# Patient Record
Sex: FEMALE | Race: WHITE | NOT HISPANIC OR LATINO | Employment: FULL TIME | URBAN - METROPOLITAN AREA
[De-identification: names, ages, dates, MRNs, and addresses within clinical notes are randomized per-mention and may not be internally consistent; named-entity substitution may affect disease eponyms.]

---

## 2018-05-02 LAB
ABSOL LYMPHOCYTES (HISTORICAL): 2.8 K/UL (ref 0.5–4)
ALBUMIN SERPL BCP-MCNC: 4.3 G/DL (ref 3–5.2)
ALP SERPL-CCNC: 76 U/L (ref 43–122)
ALT SERPL W P-5'-P-CCNC: 25 U/L (ref 9–52)
AMORPHOUS MATERIAL (HISTORICAL): ABNORMAL
ANION GAP SERPL CALCULATED.3IONS-SCNC: 12 MMOL/L (ref 5–14)
AST SERPL W P-5'-P-CCNC: 15 U/L (ref 14–36)
BACTERIA UR QL AUTO: ABNORMAL
BASOPHILS # BLD AUTO: 0.1 K/UL (ref 0–0.1)
BASOPHILS # BLD AUTO: 1 % (ref 0–1)
BILIRUB SERPL-MCNC: 0.3 MG/DL
BILIRUB UR QL STRIP: NEGATIVE MG/DL
BUN SERPL-MCNC: 15 MG/DL (ref 5–25)
CALCIUM SERPL-MCNC: 9.4 MG/DL (ref 8.4–10.2)
CASTS/CASTS TYPE (HISTORICAL): ABNORMAL /LPF
CHLORIDE SERPL-SCNC: 101 MEQ/L (ref 97–108)
CLARITY UR: CLEAR
CO2 SERPL-SCNC: 24 MMOL/L (ref 22–30)
COLOR UR: ABNORMAL
CREATINE, SERUM (HISTORICAL): 0.59 MG/DL (ref 0.6–1.2)
CRYSTAL TYPE (HISTORICAL): ABNORMAL /HPF
DEPRECATED RDW RBC AUTO: 13.9 %
EGFR (HISTORICAL): >60 ML/MIN/1.73 M2
EOSINOPHIL # BLD AUTO: 0.3 K/UL (ref 0–0.4)
EOSINOPHIL NFR BLD AUTO: 3 % (ref 0–6)
GLUCOSE SERPL-MCNC: 110 MG/DL (ref 70–99)
GLUCOSE UR STRIP-MCNC: NEGATIVE MG/DL
HCT VFR BLD AUTO: 38.1 % (ref 36–46)
HGB BLD-MCNC: 12.5 G/DL (ref 12–16)
HGB UR QL STRIP.AUTO: ABNORMAL
KETONES UR STRIP-MCNC: NEGATIVE MG/DL
LEUKOCYTE ESTERASE UR QL STRIP: NEGATIVE
LYMPHOCYTES NFR BLD AUTO: 23 % (ref 25–45)
MCH RBC QN AUTO: 27.7 PG (ref 26–34)
MCHC RBC AUTO-ENTMCNC: 32.8 % (ref 31–36)
MCV RBC AUTO: 84 FL (ref 80–100)
MONOCYTES # BLD AUTO: 0.7 K/UL (ref 0.2–0.9)
MONOCYTES NFR BLD AUTO: 6 % (ref 1–10)
MUCOUS THREADS URNS QL MICRO: ABNORMAL
NEUTROPHILS ABS COUNT (HISTORICAL): 8 K/UL (ref 1.8–7.8)
NEUTS SEG NFR BLD AUTO: 67 % (ref 45–65)
NITRITE UR QL STRIP: NEGATIVE
NON-SQ EPI CELLS URNS QL MICRO: ABNORMAL
OTHER STN SPEC: ABNORMAL
PH UR STRIP.AUTO: 6 [PH] (ref 4.5–8)
PLATELET # BLD AUTO: 268 K/MCL (ref 150–450)
POTASSIUM SERPL-SCNC: 3.9 MEQ/L (ref 3.6–5)
PROT UR STRIP-MCNC: NEGATIVE MG/DL
RBC # BLD AUTO: 4.52 M/MCL (ref 4–5.2)
RBC #/AREA URNS AUTO: ABNORMAL /HPF
SODIUM SERPL-SCNC: 137 MEQ/L (ref 137–147)
SP GR UR STRIP.AUTO: 1.01 (ref 1–1.04)
TOTAL PROTEIN (HISTORICAL): 6.9 G/DL (ref 5.9–8.4)
UROBILINOGEN UR QL STRIP.AUTO: NEGATIVE MG/DL (ref 0–1)
WBC # BLD AUTO: 11.9 K/MCL (ref 4.5–11)
WBC #/AREA URNS AUTO: ABNORMAL /HPF

## 2018-05-10 LAB — PREGNANCY TEST URINE (HISTORICAL): NEGATIVE

## 2018-08-01 ENCOUNTER — OFFICE VISIT (OUTPATIENT)
Dept: SURGERY | Facility: CLINIC | Age: 38
End: 2018-08-01

## 2018-08-01 VITALS
DIASTOLIC BLOOD PRESSURE: 79 MMHG | RESPIRATION RATE: 18 BRPM | SYSTOLIC BLOOD PRESSURE: 120 MMHG | HEART RATE: 78 BPM | HEIGHT: 69 IN | WEIGHT: 245 LBS | BODY MASS INDEX: 36.29 KG/M2

## 2018-08-01 DIAGNOSIS — E66.01 MORBID (SEVERE) OBESITY DUE TO EXCESS CALORIES (HCC): Primary | ICD-10-CM

## 2018-08-01 PROCEDURE — 99024 POSTOP FOLLOW-UP VISIT: CPT | Performed by: SPECIALIST

## 2018-08-01 RX ORDER — CYANOCOBALAMIN 500 UG/1
SPRAY NASAL
COMMUNITY
Start: 2018-07-25 | End: 2020-11-16

## 2018-08-01 RX ORDER — IRON POLYSACCHARIDE COMPLEX 150 MG
150 CAPSULE ORAL 2 TIMES DAILY
COMMUNITY

## 2018-08-01 RX ORDER — MULTIVIT-MIN/IRON FUM/FOLIC AC 7.5 MG-4
1 TABLET ORAL DAILY
COMMUNITY
End: 2020-02-04

## 2018-08-01 NOTE — PROGRESS NOTES
The patient is a little over 2 months status post laparoscopic Deedee-en-Y gastric bypass for morbid obesity  At that time she weighed 300 lb current weighs 245  Lost 55 lb  No complaints tolerating her diet  Not hungry most of the time  Also remind herself to eat  She has about 3 meals a day  Mainly pro protein  She is losing little bit of her hair    She takes multiple vitamin with minerals she also takes calcium iron B12  Her bowels move normally for the most part    She uses the elliptical   She swims  She tried water for about an hour and half yesterday and is doing quite well  She is going to run a 5K over the next few weeks  On physical exam is regular no distress   her abdomen is obese soft nontender  Her scars are healing well with no evidence of hernia  Impression doing quite well  Plan continue the same bring her back in 3 months

## 2018-11-07 ENCOUNTER — OFFICE VISIT (OUTPATIENT)
Dept: SURGERY | Facility: CLINIC | Age: 38
End: 2018-11-07
Payer: COMMERCIAL

## 2018-11-07 VITALS
HEART RATE: 80 BPM | RESPIRATION RATE: 16 BRPM | DIASTOLIC BLOOD PRESSURE: 60 MMHG | WEIGHT: 202 LBS | SYSTOLIC BLOOD PRESSURE: 94 MMHG | HEIGHT: 69 IN | BODY MASS INDEX: 29.92 KG/M2

## 2018-11-07 DIAGNOSIS — Z98.84 BARIATRIC SURGERY STATUS: Primary | ICD-10-CM

## 2018-11-07 PROCEDURE — 99213 OFFICE O/P EST LOW 20 MIN: CPT | Performed by: SPECIALIST

## 2018-11-07 NOTE — PROGRESS NOTES
Laparoscopic Deedee-en-Y gastric bypass for morbid obesity  In May of this year she weighed 283 lb  Today in the office November she weighs 202 lb  She is doing quite well and is quite happy  She has tremendous energy, sleeps better  She does have a few issues  She gets lightheaded when she goes from a sitting to a standing position although not all the time  This is been going on for few weeks  She denies any upper respiratory infection, head colds, or ear issues  She also complains of some occasional pain in her left subcostal area near her largest incision  She denies any bulge in the area  She is taking vitamins  Her bowels are moving  Physical exam:  Young adult white female awake alert no distress    Abdomen:  Obvious weight loss is present with lax skin  Her incisions are healing well with no evidence of infection  Her left subcostal incision demonstrates no obvious hernia although is sensitive to deep palpation  Impression:  Doing well status post laparoscopic Deedee-en-Y gastric bypass for morbid obesity    Plan: Will check laboratory values vitamin levels etc to get a new baseline  She is also told to increase her fluid intake  She is told to monitor the left subcostal incision as it should improve  She developed a bulge over the area that she has an obvious incisional hernia should notify the office  At this point she is discharged from the office who brought back in a few months pending laboratory values

## 2019-02-06 ENCOUNTER — OFFICE VISIT (OUTPATIENT)
Dept: SURGERY | Facility: CLINIC | Age: 39
End: 2019-02-06
Payer: COMMERCIAL

## 2019-02-06 VITALS
HEART RATE: 72 BPM | DIASTOLIC BLOOD PRESSURE: 80 MMHG | BODY MASS INDEX: 26.22 KG/M2 | HEIGHT: 69 IN | SYSTOLIC BLOOD PRESSURE: 116 MMHG | WEIGHT: 177 LBS | TEMPERATURE: 97.8 F

## 2019-02-06 DIAGNOSIS — Z98.84 BARIATRIC SURGERY STATUS: Primary | ICD-10-CM

## 2019-02-06 PROCEDURE — 99213 OFFICE O/P EST LOW 20 MIN: CPT | Performed by: SPECIALIST

## 2019-02-06 NOTE — PROGRESS NOTES
Liborio Gamez presents today for follow-up visit status post laparoscopic Deedee-en-Y gastric bypass for morbid obesity  On 05/10/2018 she weighed 285 lb  Today in the office 02/06/2019 she weighs 177 lb  She feels great  She looks great  She says she gets cold easier now which is not unusual status post bariatric surgery  She eats 3-4 meals a day i e  soup salads chicken shrimp tacos etc etc   She gets full quite easy  She had a episode of dumping after eating some honey which is fairly impressive and she would like to avoid  She takes multiple vitamins the bariatric an also gummy   Bowel movements every day but takes stool softeners  She works out on the elliptical   She has about half an hour to a shot which is actually quite good  She also tries to walk at least a 1000 steps a day which is close to 3 miles  Physical exam:  Young adult white female who does not look obese  Abdomen:  Soft nontender large pannus noted no incisional hernias are present  Impression:  Doing well status post laparoscopic Deedee-en-Y gastric bypass for morbid obesity  Plan:  Continue the same  Check laboratory values obtained when last seen in these were quite normal  Return in 6 months

## 2019-08-06 ENCOUNTER — OFFICE VISIT (OUTPATIENT)
Dept: SURGERY | Facility: CLINIC | Age: 39
End: 2019-08-06
Payer: COMMERCIAL

## 2019-08-06 VITALS
WEIGHT: 152 LBS | SYSTOLIC BLOOD PRESSURE: 118 MMHG | HEART RATE: 64 BPM | BODY MASS INDEX: 22.51 KG/M2 | HEIGHT: 69 IN | TEMPERATURE: 67.5 F | DIASTOLIC BLOOD PRESSURE: 68 MMHG

## 2019-08-06 DIAGNOSIS — Z98.84 BARIATRIC SURGERY STATUS: Primary | ICD-10-CM

## 2019-08-06 PROCEDURE — 99213 OFFICE O/P EST LOW 20 MIN: CPT | Performed by: SPECIALIST

## 2019-08-06 NOTE — PROGRESS NOTES
Ainsley Wagner presents today for postop visit status post laparoscopic Deedee-en-Y gastric bypass for morbid obesity  She had a laparoscopic Deeede-en-Y gastric bypass in May of 2018  At that time she weighed 283  ( she actually 300 prior to that )  Today in the office she weighs 152 lb  She looks marvelous   Dorathy Infield She feels marvelous  She is quite active  She has run 2 individual 5K races  She also  Swam a half a mile recently  She is not ready for a triathlon  She has about 3 times a day  She does have snacks  She does get a little lightheaded on occasion but   A snack fixes this  She does get hungry sometimes but gets full easy  She takes her multiple vitamins daily  Her bowels move without incident  Physical exam:  Adult white female who is awake alert no distress    Abdomen:  Soft flat redundant skin and pannus is noted  Laparoscopic incisions are healing well with no evidence of infection or hernia  She has excellent cosmetic result  We checked her labs in the past and they were quite good    Impression:  Doing well status post laparoscopic Deedee-en-Y gastric bypass morbid obesity    Plan: Return 6 months    Keep up the good work

## 2020-02-04 ENCOUNTER — OFFICE VISIT (OUTPATIENT)
Dept: SURGERY | Facility: CLINIC | Age: 40
End: 2020-02-04
Payer: COMMERCIAL

## 2020-02-04 VITALS
HEIGHT: 69 IN | HEART RATE: 62 BPM | WEIGHT: 154.5 LBS | RESPIRATION RATE: 16 BRPM | DIASTOLIC BLOOD PRESSURE: 62 MMHG | SYSTOLIC BLOOD PRESSURE: 114 MMHG | BODY MASS INDEX: 22.88 KG/M2

## 2020-02-04 DIAGNOSIS — Z98.84 BARIATRIC SURGERY STATUS: Primary | ICD-10-CM

## 2020-02-04 PROCEDURE — 99213 OFFICE O/P EST LOW 20 MIN: CPT | Performed by: SPECIALIST

## 2020-02-04 RX ORDER — DOCUSATE SODIUM 100 MG/1
100 CAPSULE, LIQUID FILLED ORAL
COMMUNITY

## 2020-02-04 NOTE — PROGRESS NOTES
Leelee Estrada presents today for follow-up visit status post laparoscopic Deedee-en-Y gastric bypass for morbid obesity  In May of 2018 she weighed 283 lb  She currently weighs about 154 lb  Which is pretty stable since when last seen in August she weighed 152 lb  When last seen she complained of some lower abdominal pain  She was informed by 1 of her health caregivers to follow up with her gastric bypass surgeon in regards to this  We actually told to follow up with gynecology and she did  Apparently a portion of her Essure device fractured and had migrated  This precipitated a fairly quick surgery where she underwent hysterectomy robotically  This was difficult because she had C-sections in the past had a lot of adhesions etc etc   There is still a few issues but she is finally coming out of it  Her eating habits are good  She eats about 3 meals a day but also snacks in between on healthy stuff  She has every 2-3 hours but string cheese protein shakes chicken protein etc   She takes multiple vitamins  Her bowels move fine  Physical exam:  Young adult white female who looks slim awake alert no distress    Abdomen:  A significant amount of redundant skin is noted with a pannus  Laparoscopic incisions are healing well no hernias noted  Excellent cosmetic result  Impression doing well status post laparoscopic Deedee-en-Y gastric bypass for morbid obesity  Fairly recent hysterectomy robotically  She has not been exercising since her recent surgery and is told to keep an eye on this goes a may impact her weight loss/regain  Plan:  Return here about 6 months  Prep plastic surgery is in her future in regards to her pannus  If she needs this or has any issues or problems she should call

## 2020-10-29 DIAGNOSIS — Z98.84 BARIATRIC SURGERY STATUS: Primary | ICD-10-CM

## 2020-11-16 RX ORDER — CYANOCOBALAMIN 500 UG/1
SPRAY NASAL
Qty: 4 ML | Refills: 11 | Status: SHIPPED | OUTPATIENT
Start: 2020-11-16 | End: 2021-08-23

## 2021-08-23 DIAGNOSIS — Z98.84 BARIATRIC SURGERY STATUS: ICD-10-CM

## 2021-08-23 RX ORDER — CYANOCOBALAMIN 500 UG/1
SPRAY NASAL
Qty: 4 ML | Refills: 11 | Status: SHIPPED | OUTPATIENT
Start: 2021-08-23 | End: 2022-07-06

## 2021-12-22 ENCOUNTER — OFFICE VISIT (OUTPATIENT)
Dept: URGENT CARE | Facility: CLINIC | Age: 41
End: 2021-12-22
Payer: COMMERCIAL

## 2021-12-22 VITALS
HEART RATE: 80 BPM | OXYGEN SATURATION: 96 % | BODY MASS INDEX: 23.7 KG/M2 | HEIGHT: 69 IN | TEMPERATURE: 96.3 F | WEIGHT: 160 LBS | RESPIRATION RATE: 16 BRPM

## 2021-12-22 DIAGNOSIS — J06.9 VIRAL URI: Primary | ICD-10-CM

## 2021-12-22 DIAGNOSIS — Z20.822 EXPOSURE TO COVID-19 VIRUS: ICD-10-CM

## 2021-12-22 PROCEDURE — 99213 OFFICE O/P EST LOW 20 MIN: CPT | Performed by: PHYSICIAN ASSISTANT

## 2021-12-22 PROCEDURE — 87636 SARSCOV2 & INF A&B AMP PRB: CPT | Performed by: PHYSICIAN ASSISTANT

## 2021-12-24 LAB
FLUAV RNA RESP QL NAA+PROBE: NEGATIVE
FLUBV RNA RESP QL NAA+PROBE: NEGATIVE
SARS-COV-2 RNA RESP QL NAA+PROBE: POSITIVE

## 2021-12-29 ENCOUNTER — OFFICE VISIT (OUTPATIENT)
Dept: URGENT CARE | Facility: CLINIC | Age: 41
End: 2021-12-29
Payer: COMMERCIAL

## 2021-12-29 ENCOUNTER — APPOINTMENT (OUTPATIENT)
Dept: RADIOLOGY | Facility: CLINIC | Age: 41
End: 2021-12-29
Payer: COMMERCIAL

## 2021-12-29 VITALS
BODY MASS INDEX: 23.7 KG/M2 | HEIGHT: 69 IN | TEMPERATURE: 99.1 F | HEART RATE: 78 BPM | RESPIRATION RATE: 16 BRPM | OXYGEN SATURATION: 95 % | WEIGHT: 160 LBS

## 2021-12-29 DIAGNOSIS — R05.9 COUGH: Primary | ICD-10-CM

## 2021-12-29 DIAGNOSIS — U07.1 COVID-19 VIRUS INFECTION: ICD-10-CM

## 2021-12-29 DIAGNOSIS — R05.9 COUGH: ICD-10-CM

## 2021-12-29 PROCEDURE — 99213 OFFICE O/P EST LOW 20 MIN: CPT | Performed by: EMERGENCY MEDICINE

## 2021-12-29 PROCEDURE — 71046 X-RAY EXAM CHEST 2 VIEWS: CPT

## 2021-12-29 RX ORDER — ALBUTEROL SULFATE 90 UG/1
2 AEROSOL, METERED RESPIRATORY (INHALATION) EVERY 6 HOURS PRN
Qty: 8.5 G | Refills: 0 | Status: SHIPPED | OUTPATIENT
Start: 2021-12-29

## 2022-06-26 DIAGNOSIS — Z98.84 BARIATRIC SURGERY STATUS: ICD-10-CM

## 2022-07-06 RX ORDER — CYANOCOBALAMIN 500 UG/1
SPRAY NASAL
Qty: 4 ML | Refills: 11 | Status: SHIPPED | OUTPATIENT
Start: 2022-07-06

## 2022-07-09 ENCOUNTER — OFFICE VISIT (OUTPATIENT)
Dept: URGENT CARE | Facility: CLINIC | Age: 42
End: 2022-07-09
Payer: COMMERCIAL

## 2022-07-09 ENCOUNTER — APPOINTMENT (OUTPATIENT)
Dept: RADIOLOGY | Facility: CLINIC | Age: 42
End: 2022-07-09
Payer: COMMERCIAL

## 2022-07-09 VITALS
RESPIRATION RATE: 17 BRPM | SYSTOLIC BLOOD PRESSURE: 106 MMHG | OXYGEN SATURATION: 99 % | TEMPERATURE: 97.2 F | DIASTOLIC BLOOD PRESSURE: 51 MMHG | WEIGHT: 160 LBS | HEIGHT: 69 IN | BODY MASS INDEX: 23.7 KG/M2 | HEART RATE: 72 BPM

## 2022-07-09 DIAGNOSIS — S93.491A SPRAIN OF ANTERIOR TALOFIBULAR LIGAMENT OF RIGHT ANKLE, INITIAL ENCOUNTER: ICD-10-CM

## 2022-07-09 DIAGNOSIS — S99.911A ANKLE INJURIES, RIGHT, INITIAL ENCOUNTER: Primary | ICD-10-CM

## 2022-07-09 DIAGNOSIS — S99.911A ANKLE INJURIES, RIGHT, INITIAL ENCOUNTER: ICD-10-CM

## 2022-07-09 PROCEDURE — 73610 X-RAY EXAM OF ANKLE: CPT

## 2022-07-09 PROCEDURE — 99213 OFFICE O/P EST LOW 20 MIN: CPT | Performed by: NURSE PRACTITIONER

## 2022-07-09 NOTE — PATIENT INSTRUCTIONS
Swollen Joint   AMBULATORY CARE:   Joint swelling  may occur in one or more joints  You may have other symptoms, such as pain, tenderness, or stiffness  A swollen joint may be caused by a variety of conditions such as arthritis, pseudogout, gout, tendinitis, or injury  Seek care immediately if:   You cannot move your joint at all  You have severe pain that does not get better with medicine  Contact your healthcare provider if:   You have a fever  You have redness or warmth over the joint  The swelling does not decrease with treatment  You have questions or concerns about your condition or care  Treatment for a swollen joint  depends on the cause of your swollen joint  Your healthcare provider may recommend any of the following:  Rest  your swollen joint  Avoid activities that make the swelling or pain worse  You may need to avoid putting weight on your joint while you have pain  Crutches or a walker can be used to avoid putting weight on joints in your lower body  Apply ice  on your swollen joint for 15 to 20 minutes every hour or as directed  Use an ice pack, or put crushed ice in a plastic bag  Cover it with a towel  Ice helps prevent tissue damage and decreases swelling and pain  Apply heat  on your swollen joint for 20 to 30 minutes every 2 hours for as many days as directed  Heat helps decrease pain  Elevate  your swollen joint above the level of your heart as often as you can  This will help decrease swelling and pain  Prop your joint on pillows or blankets to keep it elevated comfortably  NSAIDs , such as ibuprofen, help decrease swelling, pain, and fever  This medicine is available with or without a doctor's order  NSAIDs can cause stomach bleeding or kidney problems in certain people  If you take blood thinner medicine, always ask your healthcare provider if NSAIDs are safe for you  Always read the medicine label and follow directions      Follow up with your doctor as directed:  Write down your questions so you remember to ask them during your visits  © Copyright [employer] 2022 Information is for End User's use only and may not be sold, redistributed or otherwise used for commercial purposes  All illustrations and images included in CareNotes® are the copyrighted property of A D A M , Inc  or Hien Huber  The above information is an  only  It is not intended as medical advice for individual conditions or treatments  Talk to your doctor, nurse or pharmacist before following any medical regimen to see if it is safe and effective for you

## 2022-07-09 NOTE — PROGRESS NOTES
[facility]        NAME: Laila Estrada is a 36 y o  female  : 1980    MRN: [id number]  DATE: 2022  TIME: 2:04 PM    Assessment and Plan   Ankle injuries, right, initial encounter [S99 911A]  1  Ankle injuries, right, initial encounter  XR ankle 3+ vw right   2  Sprain of anterior talofibular ligament of right ankle, initial encounter         Xray done in office - images reviewed by myself - no evidence of fracture   Discussed sprain - RICE -   Reviewed exercises  F/u with pcp   Pt in agreement with plan   Patient Instructions     Follow up with PCP in 3-5 days  Proceed to  ER if symptoms worsen  Chief Complaint     Chief Complaint   Patient presents with    Ankle Pain     Right Ankle Pain S/P swimming and hitting against pool wall on          History of Present Illness   Laila Estrada presents to the clinic c/o    Cloteal Rola was at the pool - went into deep end to get a diving stick for the kids - when she was coming back her foot hit the side of the wall as she was kicking and she rolled her ankle  Applied ice and took some extra strength tylenol   Pain worse this morning when she woke up  Here for eval  Notes swelling      Review of Systems   Review of Systems   All other systems reviewed and are negative          Current Medications     Long-Term Medications   Medication Sig Dispense Refill    calcium acetate (PHOSLO) 667 mg capsule Take 1,334 mg by mouth 3 (three) times a day with meals      calcium acetate (PHOSLO) 667 mg capsule Take 1,334 mg by mouth      docusate sodium (COLACE) 100 mg capsule Take 100 mg by mouth      iron polysaccharides (NIFEREX) 150 mg capsule Take 150 mg by mouth 2 (two) times a day      multivitamin-minerals (CENTRUM) tablet Take 1 tablet by mouth      Nascobal 500 MCG/0 1ML SOLN USE 1 SPRAY IN ALTERNATING NOSTRIL ONCE WEEKLY 4 mL 11       Current Allergies     Allergies as of 2022 - Reviewed 2022   Allergen Reaction Noted  Aspartame - food allergy Headache 08/29/2019    Other Diarrhea 09/09/2019            The following portions of the patient's history were reviewed and updated as appropriate: allergies, current medications, past family history, past medical history, past social history, past surgical history and problem list     Objective   /51 (BP Location: Left arm, Patient Position: Sitting)   Pulse 72   Temp (!) 97 2 °F (36 2 °C)   Resp 17   Ht 5' 9" (1 753 m)   Wt 72 6 kg (160 lb)   LMP 07/29/2019 (Exact Date)   SpO2 99%   BMI 23 63 kg/m²        Physical Exam     Physical Exam  Vitals and nursing note reviewed  Constitutional:       Appearance: Normal appearance  She is well-developed  HENT:      Head: Normocephalic and atraumatic  Eyes:      General: Lids are normal       Conjunctiva/sclera: Conjunctivae normal    Cardiovascular:      Rate and Rhythm: Normal rate and regular rhythm  Heart sounds: Normal heart sounds, S1 normal and S2 normal    Pulmonary:      Effort: Pulmonary effort is normal       Breath sounds: Normal breath sounds  Musculoskeletal:      Right ankle: Swelling present  No deformity, ecchymosis or lacerations  Tenderness present over the ATF ligament  No lateral malleolus, medial malleolus, AITF ligament, CF ligament, posterior TF ligament, base of 5th metatarsal or proximal fibula tenderness  Decreased range of motion  Anterior drawer test negative  Normal pulse  Right Achilles Tendon: Normal    Skin:     General: Skin is warm and dry  Neurological:      Mental Status: She is alert and oriented to person, place, and time  Psychiatric:         Speech: Speech normal          Behavior: Behavior normal  Behavior is cooperative  Thought Content:  Thought content normal          Judgment: Judgment normal

## 2023-01-20 ENCOUNTER — OFFICE VISIT (OUTPATIENT)
Dept: URGENT CARE | Facility: CLINIC | Age: 43
End: 2023-01-20

## 2023-01-20 VITALS
HEART RATE: 85 BPM | HEIGHT: 69 IN | DIASTOLIC BLOOD PRESSURE: 66 MMHG | TEMPERATURE: 97.1 F | OXYGEN SATURATION: 99 % | SYSTOLIC BLOOD PRESSURE: 142 MMHG | BODY MASS INDEX: 23.7 KG/M2 | WEIGHT: 160 LBS | RESPIRATION RATE: 18 BRPM

## 2023-01-20 DIAGNOSIS — J32.9 SINOBRONCHITIS: ICD-10-CM

## 2023-01-20 DIAGNOSIS — J40 SINOBRONCHITIS: ICD-10-CM

## 2023-01-20 DIAGNOSIS — G43.009 MIGRAINE WITHOUT AURA AND WITHOUT STATUS MIGRAINOSUS, NOT INTRACTABLE: Primary | ICD-10-CM

## 2023-01-20 RX ORDER — ONDANSETRON 4 MG/1
4 TABLET, FILM COATED ORAL EVERY 8 HOURS PRN
Qty: 20 TABLET | Refills: 0 | Status: SHIPPED | OUTPATIENT
Start: 2023-01-20

## 2023-01-20 RX ORDER — PREDNISONE 10 MG/1
10 TABLET ORAL DAILY
Qty: 21 TABLET | Refills: 0 | Status: SHIPPED | OUTPATIENT
Start: 2023-01-20

## 2023-01-20 RX ORDER — METOCLOPRAMIDE 10 MG/1
10 TABLET ORAL ONCE
Status: DISCONTINUED | OUTPATIENT
Start: 2023-01-20 | End: 2023-01-20

## 2023-01-20 RX ORDER — KETOROLAC TROMETHAMINE 30 MG/ML
60 INJECTION, SOLUTION INTRAMUSCULAR; INTRAVENOUS ONCE
Status: COMPLETED | OUTPATIENT
Start: 2023-01-20 | End: 2023-01-20

## 2023-01-20 RX ORDER — BENZONATATE 200 MG/1
200 CAPSULE ORAL 3 TIMES DAILY PRN
Qty: 20 CAPSULE | Refills: 0 | Status: SHIPPED | OUTPATIENT
Start: 2023-01-20 | End: 2023-02-03

## 2023-01-20 RX ORDER — AMOXICILLIN AND CLAVULANATE POTASSIUM 875; 125 MG/1; MG/1
1 TABLET, FILM COATED ORAL EVERY 12 HOURS SCHEDULED
Qty: 14 TABLET | Refills: 0 | Status: SHIPPED | OUTPATIENT
Start: 2023-01-20 | End: 2023-01-27

## 2023-01-20 RX ADMIN — Medication 25 MG: at 10:09

## 2023-01-20 RX ADMIN — KETOROLAC TROMETHAMINE 60 MG: 30 INJECTION, SOLUTION INTRAMUSCULAR; INTRAVENOUS at 10:09

## 2023-01-20 NOTE — PROGRESS NOTES
[facility]        NAME: Vanessa Muprhy is a 36 y o  female  : 1980    MRN: [id number]  DATE: 2023  TIME: 10:55 AM    Assessment and Plan   Migraine without aura and without status migrainosus, not intractable [G43 009]  1  Migraine without aura and without status migrainosus, not intractable  ketorolac (TORADOL) injection 60 mg    diphenhydrAMINE (BENADRYL) oral liquid 25 mg    predniSONE 10 mg tablet    ondansetron (ZOFRAN) 4 mg tablet    DISCONTINUED: metoclopramide (REGLAN) tablet 10 mg      2  Sinobronchitis  benzonatate (TESSALON) 200 MG capsule    amoxicillin-clavulanate (AUGMENTIN) 875-125 mg per tablet        Improvement of symptoms after medications    Patient Instructions   Medications as prescribed  Benadryl  Drink plenty of fluids  Dark room  Instructions go to ER symptoms worsen anyway  Follow up with PCP in 3-5 days  Proceed to  ER if symptoms worsen  Chief Complaint     Chief Complaint   Patient presents with   • Cough     Starting last  sore throat and ear pain - resolved  Cough started 1 week ago and has worsened  She had coughing spell this morning and felt pain in the back of her head and dizziness  Now she has a bad headache/migraine  History of Present Illness       Patient is a 60-year-old female with significant past medical history of migraines and gastric bypass presents the office complaining of severe headache since this morning  Patient reports she has been ill with congestion, rhinorrhea, sore throat, and cough for almost 1 week  Reports having a coughing fit this morning which then triggered a headache in the posterior aspect of her head  Pain is rated 10 out of 10 throbbing which is worse with coughing or blowing her nose  She has associated dizziness, nausea, and difficulty concentrating  Denies syncopal episode, slurred speech, vision changes, chest pain, shortness of breath, vomiting, or unilateral weakness    History of migraines but states this 1 feels different  Reports multiple other family members are currently sick at home with similar symptoms but not severe headache  She did not take anything for her symptoms this morning  Review of Systems   Review of Systems   Constitutional: Negative for fever  HENT: Positive for congestion and sore throat  Negative for ear pain  Eyes: Positive for photophobia  Negative for visual disturbance  Respiratory: Positive for cough  Negative for shortness of breath  Cardiovascular: Negative for chest pain and palpitations  Gastrointestinal: Positive for nausea  Negative for abdominal pain, diarrhea and vomiting  Musculoskeletal: Negative for neck pain  Skin: Negative for rash  Neurological: Positive for dizziness, light-headedness and headaches  Negative for syncope, facial asymmetry and speech difficulty           Current Medications       Current Outpatient Medications:   •  amoxicillin-clavulanate (AUGMENTIN) 875-125 mg per tablet, Take 1 tablet by mouth every 12 (twelve) hours for 7 days, Disp: 14 tablet, Rfl: 0  •  benzonatate (TESSALON) 200 MG capsule, Take 1 capsule (200 mg total) by mouth 3 (three) times a day as needed for cough for up to 14 days, Disp: 20 capsule, Rfl: 0  •  calcium acetate (PHOSLO) 667 mg capsule, Take 1,334 mg by mouth 3 (three) times a day with meals, Disp: , Rfl:   •  calcium acetate (PHOSLO) 667 mg capsule, Take 1,334 mg by mouth, Disp: , Rfl:   •  docusate sodium (COLACE) 100 mg capsule, Take 100 mg by mouth, Disp: , Rfl:   •  iron polysaccharides (NIFEREX) 150 mg capsule, Take 150 mg by mouth 2 (two) times a day, Disp: , Rfl:   •  multivitamin-minerals (CENTRUM) tablet, Take 1 tablet by mouth, Disp: , Rfl:   •  ondansetron (ZOFRAN) 4 mg tablet, Take 1 tablet (4 mg total) by mouth every 8 (eight) hours as needed for nausea or vomiting, Disp: 20 tablet, Rfl: 0  •  predniSONE 10 mg tablet, Take 1 tablet (10 mg total) by mouth daily Take 6 on day 1, take 5 on day 2, take 4 on day 3, take 3 on day 4, take 2 on day 5, take 1 on day 6 , Disp: 21 tablet, Rfl: 0  •  albuterol (ProAir HFA) 90 mcg/act inhaler, Inhale 2 puffs every 6 (six) hours as needed for shortness of breath (Patient not taking: Reported on 2023), Disp: 8 5 g, Rfl: 0  •  Nascobal 500 MCG/0 1ML SOLN, USE 1 SPRAY IN ALTERNATING NOSTRIL ONCE WEEKLY (Patient not taking: Reported on 2023), Disp: 4 mL, Rfl: 11  No current facility-administered medications for this visit  Current Allergies     Allergies as of 2023 - Reviewed 2023   Allergen Reaction Noted   • Aspartame - food allergy Headache 2019   • Other Diarrhea 2019            The following portions of the patient's history were reviewed and updated as appropriate: allergies, current medications, past family history, past medical history, past social history, past surgical history and problem list      Past Medical History:   Diagnosis Date   • Migraines        Past Surgical History:   Procedure Laterality Date   •  SECTION     • DILATION AND CURETTAGE OF UTERUS     • GASTRIC BYPASS     • WISDOM TOOTH EXTRACTION         Family History   Problem Relation Age of Onset   • Hypertension Mother    • Cataracts Mother    • Cancer Mother    • Diabetes Father    • Cancer Maternal Grandmother          Medications have been verified  Objective   /66   Pulse 85   Temp (!) 97 1 °F (36 2 °C)   Resp 18   Ht 5' 9" (1 753 m)   Wt 72 6 kg (160 lb)   LMP 2019 (Exact Date)   SpO2 99%   BMI 23 63 kg/m²   Patient's last menstrual period was 2019 (exact date)  Physical Exam     Physical Exam  Vitals and nursing note reviewed  Constitutional:       Appearance: Normal appearance  She is well-developed  Comments: Crying on exam   HENT:      Head: Normocephalic and atraumatic        Right Ear: Tympanic membrane, ear canal and external ear normal       Left Ear: Tympanic membrane, ear canal and external ear normal       Nose: Congestion and rhinorrhea present  Mouth/Throat:      Pharynx: Uvula midline  Eyes:      General: Lids are normal       Extraocular Movements: Extraocular movements intact  Conjunctiva/sclera: Conjunctivae normal       Pupils: Pupils are equal, round, and reactive to light  Cardiovascular:      Rate and Rhythm: Normal rate and regular rhythm  Pulses: Normal pulses  Heart sounds: Normal heart sounds  No murmur heard  No friction rub  No gallop  Pulmonary:      Effort: Pulmonary effort is normal       Breath sounds: Normal breath sounds  No wheezing, rhonchi or rales  Musculoskeletal:         General: Normal range of motion  Cervical back: Neck supple  Lymphadenopathy:      Cervical: No cervical adenopathy  Skin:     General: Skin is warm and dry  Capillary Refill: Capillary refill takes less than 2 seconds  Neurological:      General: No focal deficit present  Mental Status: She is alert  Cranial Nerves: Cranial nerves 2-12 are intact  Sensory: Sensation is intact  Motor: Motor function is intact  Coordination: Coordination is intact  Gait: Gait is intact        Comments: Photophobia

## 2024-02-11 ENCOUNTER — OFFICE VISIT (OUTPATIENT)
Dept: URGENT CARE | Facility: CLINIC | Age: 44
End: 2024-02-11
Payer: COMMERCIAL

## 2024-02-11 VITALS
OXYGEN SATURATION: 100 % | DIASTOLIC BLOOD PRESSURE: 58 MMHG | WEIGHT: 169.2 LBS | BODY MASS INDEX: 25.06 KG/M2 | SYSTOLIC BLOOD PRESSURE: 122 MMHG | HEIGHT: 69 IN | HEART RATE: 81 BPM | TEMPERATURE: 97.9 F | RESPIRATION RATE: 20 BRPM

## 2024-02-11 DIAGNOSIS — R05.9 COUGH, UNSPECIFIED TYPE: ICD-10-CM

## 2024-02-11 DIAGNOSIS — J20.9 ACUTE BRONCHITIS, UNSPECIFIED ORGANISM: Primary | ICD-10-CM

## 2024-02-11 LAB
SARS-COV-2 AG UPPER RESP QL IA: NEGATIVE
VALID CONTROL: NORMAL

## 2024-02-11 PROCEDURE — S9083 URGENT CARE CENTER GLOBAL: HCPCS | Performed by: PHYSICIAN ASSISTANT

## 2024-02-11 PROCEDURE — 87811 SARS-COV-2 COVID19 W/OPTIC: CPT | Performed by: PHYSICIAN ASSISTANT

## 2024-02-11 PROCEDURE — G0382 LEV 3 HOSP TYPE B ED VISIT: HCPCS | Performed by: PHYSICIAN ASSISTANT

## 2024-02-11 RX ORDER — BENZONATATE 200 MG/1
200 CAPSULE ORAL 3 TIMES DAILY PRN
Qty: 20 CAPSULE | Refills: 0 | Status: SHIPPED | OUTPATIENT
Start: 2024-02-11

## 2024-02-11 RX ORDER — PREDNISONE 10 MG/1
TABLET ORAL
Qty: 21 TABLET | Refills: 0 | Status: SHIPPED | OUTPATIENT
Start: 2024-02-11

## 2024-02-11 RX ORDER — ALBUTEROL SULFATE 90 UG/1
2 AEROSOL, METERED RESPIRATORY (INHALATION) EVERY 6 HOURS PRN
Qty: 8.5 G | Refills: 0 | Status: SHIPPED | OUTPATIENT
Start: 2024-02-11

## 2024-02-11 NOTE — PROGRESS NOTES
[facility]        NAME: Arlin Menjivar is a 43 y.o. female  : 1980    MRN: [id number]  DATE: 2024  TIME: 4:45 PM    Assessment and Plan   Acute bronchitis, unspecified organism [J20.9]  1. Acute bronchitis, unspecified organism  predniSONE 10 mg tablet    benzonatate (TESSALON) 200 MG capsule    albuterol (ProAir HFA) 90 mcg/act inhaler      2. Cough, unspecified type  Poct Covid 19 Rapid Antigen Test    benzonatate (TESSALON) 200 MG capsule            Patient Instructions     Patient has bronchitis which I will treat with a combination of an oral prednisone taper, Tessalon Perles, and rescue inhaler and recommended fluids, rest, discussed OTC cough and cold meds, close observation.  Rapid COVID testing today was negative.  Follow up with PCP in 3-5 days.  Proceed to  ER if symptoms worsen.    Chief Complaint     Chief Complaint   Patient presents with    Cold Like Symptoms     Body aches, fatigued, cough, and runny nose starting 4 days ago. Hoarse voice starting yesterday. Chest pain with inspiration and coughing starting this AM.          History of Present Illness       Presents with several day history of fatigue, myalgias, runny nose, congestion, PND, cough, hoarseness, chest pain secondary to cough and with deep breathing.  Denies fever, chills, NVD, recent COVID exposure but has not home tested.        Review of Systems   Review of Systems   Constitutional:  Positive for fatigue. Negative for chills and fever.   HENT:  Positive for congestion, postnasal drip, rhinorrhea and voice change (Hoarseness). Negative for sore throat.    Respiratory:  Positive for cough.         Chest pain with deep breathing and cough   Cardiovascular: Negative.    Gastrointestinal: Negative.    Genitourinary: Negative.    Musculoskeletal:  Positive for myalgias.         Current Medications       Current Outpatient Medications:     albuterol (ProAir HFA) 90 mcg/act inhaler, Inhale 2 puffs every 6  (six) hours as needed for wheezing, Disp: 8.5 g, Rfl: 0    benzonatate (TESSALON) 200 MG capsule, Take 1 capsule (200 mg total) by mouth 3 (three) times a day as needed for cough, Disp: 20 capsule, Rfl: 0    calcium acetate (PHOSLO) 667 mg capsule, Take 1,334 mg by mouth in the morning, Disp: , Rfl:     Cholecalciferol 50 MCG (2000 UT) CAPS, Take by mouth, Disp: , Rfl:     docusate sodium (COLACE) 100 mg capsule, Take 100 mg by mouth, Disp: , Rfl:     iron polysaccharides (NIFEREX) 150 mg capsule, Take 150 mg by mouth 2 (two) times a day, Disp: , Rfl:     multivitamin-minerals (CENTRUM) tablet, Take 1 tablet by mouth, Disp: , Rfl:     NAPROXEN PO, Take by mouth, Disp: , Rfl:     predniSONE 10 mg tablet, 6-5-4-3-2-1 taper with food., Disp: 21 tablet, Rfl: 0    albuterol (ProAir HFA) 90 mcg/act inhaler, Inhale 2 puffs every 6 (six) hours as needed for shortness of breath (Patient not taking: Reported on 1/20/2023), Disp: 8.5 g, Rfl: 0    calcium acetate (PHOSLO) 667 mg capsule, Take 1,334 mg by mouth (Patient not taking: Reported on 2/11/2024), Disp: , Rfl:     Nascobal 500 MCG/0.1ML SOLN, USE 1 SPRAY IN ALTERNATING NOSTRIL ONCE WEEKLY (Patient not taking: Reported on 1/20/2023), Disp: 4 mL, Rfl: 11    ondansetron (ZOFRAN) 4 mg tablet, Take 1 tablet (4 mg total) by mouth every 8 (eight) hours as needed for nausea or vomiting (Patient not taking: Reported on 2/11/2024), Disp: 20 tablet, Rfl: 0    Current Allergies     Allergies as of 02/11/2024 - Reviewed 02/11/2024   Allergen Reaction Noted    Aspartame - food allergy Headache 08/29/2019    Other Diarrhea 09/09/2019            The following portions of the patient's history were reviewed and updated as appropriate: allergies, current medications, past family history, past medical history, past social history, past surgical history and problem list.     Past Medical History:   Diagnosis Date    Migraines        Past Surgical History:   Procedure Laterality Date     " SECTION      DILATION AND CURETTAGE OF UTERUS      GASTRIC BYPASS      HYSTERECTOMY      WISDOM TOOTH EXTRACTION         Family History   Problem Relation Age of Onset    Hypertension Mother     Cataracts Mother     Cancer Mother     Diabetes Father     Cancer Maternal Grandmother          Medications have been verified.        Objective   /58   Pulse 81   Temp 97.9 °F (36.6 °C)   Resp 20   Ht 5' 9\" (1.753 m)   Wt 76.7 kg (169 lb 3.2 oz)   LMP 2019 (Exact Date)   SpO2 100%   Breastfeeding No   BMI 24.99 kg/m²   Patient's last menstrual period was 2019 (exact date).       Physical Exam     Physical Exam  Vitals reviewed.   Constitutional:       General: She is not in acute distress.     Appearance: She is well-developed.   HENT:      Right Ear: Hearing, tympanic membrane, ear canal and external ear normal.      Left Ear: Hearing, tympanic membrane, ear canal and external ear normal.      Nose: Mucosal edema (B/L boggy turbinates) and congestion present.      Mouth/Throat:      Mouth: Mucous membranes are moist.      Pharynx: Posterior oropharyngeal erythema (PND) present. No oropharyngeal exudate.      Tonsils: No tonsillar exudate.   Cardiovascular:      Rate and Rhythm: Normal rate and regular rhythm.      Pulses: Normal pulses.      Heart sounds: Normal heart sounds. No murmur heard.  Pulmonary:      Effort: Pulmonary effort is normal. No respiratory distress.      Breath sounds: Rhonchi present. No wheezing.      Comments: Bilateral diffuse coarse rhonchi heard throughout.  Musculoskeletal:      Cervical back: Neck supple.   Lymphadenopathy:      Cervical: No cervical adenopathy.   Neurological:      Mental Status: She is alert and oriented to person, place, and time.                   "

## 2024-06-06 ENCOUNTER — HOSPITAL ENCOUNTER (EMERGENCY)
Facility: HOSPITAL | Age: 44
Discharge: HOME | End: 2024-06-06
Attending: EMERGENCY MEDICINE
Payer: COMMERCIAL

## 2024-06-06 ENCOUNTER — APPOINTMENT (EMERGENCY)
Dept: CT IMAGING | Facility: HOSPITAL | Age: 44
End: 2024-06-06
Payer: COMMERCIAL

## 2024-06-06 ENCOUNTER — TELEPHONE (OUTPATIENT)
Age: 44
End: 2024-06-06

## 2024-06-06 ENCOUNTER — NURSE TRIAGE (OUTPATIENT)
Dept: SURGERY | Facility: CLINIC | Age: 44
End: 2024-06-06

## 2024-06-06 ENCOUNTER — APPOINTMENT (EMERGENCY)
Dept: ULTRASOUND IMAGING | Facility: HOSPITAL | Age: 44
End: 2024-06-06
Payer: COMMERCIAL

## 2024-06-06 VITALS
HEART RATE: 51 BPM | DIASTOLIC BLOOD PRESSURE: 60 MMHG | OXYGEN SATURATION: 100 % | WEIGHT: 169.53 LBS | SYSTOLIC BLOOD PRESSURE: 131 MMHG | TEMPERATURE: 98.3 F | RESPIRATION RATE: 18 BRPM | BODY MASS INDEX: 25.04 KG/M2

## 2024-06-06 DIAGNOSIS — R10.9 ABDOMINAL PAIN: ICD-10-CM

## 2024-06-06 DIAGNOSIS — R11.0 NAUSEA: ICD-10-CM

## 2024-06-06 DIAGNOSIS — K29.70 GASTRITIS: Primary | ICD-10-CM

## 2024-06-06 DIAGNOSIS — Z98.84 HISTORY OF BARIATRIC SURGERY: ICD-10-CM

## 2024-06-06 DIAGNOSIS — K83.8 DILATION OF BILIARY TRACT: ICD-10-CM

## 2024-06-06 LAB
ALBUMIN SERPL BCP-MCNC: 4.6 G/DL (ref 3.5–5)
ALP SERPL-CCNC: 48 U/L (ref 34–104)
ALT SERPL W P-5'-P-CCNC: 12 U/L (ref 7–52)
ANION GAP SERPL CALCULATED.3IONS-SCNC: 7 MMOL/L (ref 4–13)
AST SERPL W P-5'-P-CCNC: 16 U/L (ref 13–39)
BASOPHILS # BLD AUTO: 0.05 THOUSANDS/ÂΜL (ref 0–0.1)
BASOPHILS NFR BLD AUTO: 1 % (ref 0–1)
BILIRUB SERPL-MCNC: 0.65 MG/DL (ref 0.2–1)
BILIRUB UR QL STRIP: NEGATIVE
BUN SERPL-MCNC: 11 MG/DL (ref 5–25)
CALCIUM SERPL-MCNC: 9.5 MG/DL (ref 8.4–10.2)
CHLORIDE SERPL-SCNC: 103 MMOL/L (ref 96–108)
CLARITY UR: CLEAR
CO2 SERPL-SCNC: 30 MMOL/L (ref 21–32)
COLOR UR: NORMAL
CREAT SERPL-MCNC: 0.64 MG/DL (ref 0.6–1.3)
EOSINOPHIL # BLD AUTO: 0.29 THOUSAND/ÂΜL (ref 0–0.61)
EOSINOPHIL NFR BLD AUTO: 5 % (ref 0–6)
ERYTHROCYTE [DISTWIDTH] IN BLOOD BY AUTOMATED COUNT: 11.9 % (ref 11.6–15.1)
GFR SERPL CREATININE-BSD FRML MDRD: 108 ML/MIN/1.73SQ M
GLUCOSE SERPL-MCNC: 88 MG/DL (ref 65–140)
GLUCOSE UR STRIP-MCNC: NEGATIVE MG/DL
HCT VFR BLD AUTO: 42.9 % (ref 34.8–46.1)
HGB BLD-MCNC: 14.1 G/DL (ref 11.5–15.4)
HGB UR QL STRIP.AUTO: NEGATIVE
IMM GRANULOCYTES # BLD AUTO: 0.01 THOUSAND/UL (ref 0–0.2)
IMM GRANULOCYTES NFR BLD AUTO: 0 % (ref 0–2)
KETONES UR STRIP-MCNC: NEGATIVE MG/DL
LEUKOCYTE ESTERASE UR QL STRIP: NEGATIVE
LIPASE SERPL-CCNC: 24 U/L (ref 11–82)
LYMPHOCYTES # BLD AUTO: 2.75 THOUSANDS/ÂΜL (ref 0.6–4.47)
LYMPHOCYTES NFR BLD AUTO: 43 % (ref 14–44)
MCH RBC QN AUTO: 29.5 PG (ref 26.8–34.3)
MCHC RBC AUTO-ENTMCNC: 32.9 G/DL (ref 31.4–37.4)
MCV RBC AUTO: 90 FL (ref 82–98)
MONOCYTES # BLD AUTO: 0.55 THOUSAND/ÂΜL (ref 0.17–1.22)
MONOCYTES NFR BLD AUTO: 9 % (ref 4–12)
NEUTROPHILS # BLD AUTO: 2.6 THOUSANDS/ÂΜL (ref 1.85–7.62)
NEUTS SEG NFR BLD AUTO: 42 % (ref 43–75)
NITRITE UR QL STRIP: NEGATIVE
NRBC BLD AUTO-RTO: 0 /100 WBCS
PH UR STRIP.AUTO: 6 [PH]
PLATELET # BLD AUTO: 207 THOUSANDS/UL (ref 149–390)
PMV BLD AUTO: 11 FL (ref 8.9–12.7)
POTASSIUM SERPL-SCNC: 4.5 MMOL/L (ref 3.5–5.3)
PROT SERPL-MCNC: 7 G/DL (ref 6.4–8.4)
PROT UR STRIP-MCNC: NEGATIVE MG/DL
RBC # BLD AUTO: 4.78 MILLION/UL (ref 3.81–5.12)
SODIUM SERPL-SCNC: 140 MMOL/L (ref 135–147)
SP GR UR STRIP.AUTO: 1.02 (ref 1–1.03)
UROBILINOGEN UR STRIP-ACNC: <2 MG/DL
WBC # BLD AUTO: 6.25 THOUSAND/UL (ref 4.31–10.16)

## 2024-06-06 PROCEDURE — 96375 TX/PRO/DX INJ NEW DRUG ADDON: CPT

## 2024-06-06 PROCEDURE — 99284 EMERGENCY DEPT VISIT MOD MDM: CPT

## 2024-06-06 PROCEDURE — 85025 COMPLETE CBC W/AUTO DIFF WBC: CPT

## 2024-06-06 PROCEDURE — 76705 ECHO EXAM OF ABDOMEN: CPT

## 2024-06-06 PROCEDURE — 80053 COMPREHEN METABOLIC PANEL: CPT

## 2024-06-06 PROCEDURE — 74177 CT ABD & PELVIS W/CONTRAST: CPT

## 2024-06-06 PROCEDURE — 36415 COLL VENOUS BLD VENIPUNCTURE: CPT

## 2024-06-06 PROCEDURE — 99285 EMERGENCY DEPT VISIT HI MDM: CPT | Performed by: EMERGENCY MEDICINE

## 2024-06-06 PROCEDURE — 83690 ASSAY OF LIPASE: CPT

## 2024-06-06 PROCEDURE — 96365 THER/PROPH/DIAG IV INF INIT: CPT

## 2024-06-06 PROCEDURE — 81003 URINALYSIS AUTO W/O SCOPE: CPT

## 2024-06-06 RX ORDER — ONDANSETRON 2 MG/ML
4 INJECTION INTRAMUSCULAR; INTRAVENOUS ONCE
Status: COMPLETED | OUTPATIENT
Start: 2024-06-06 | End: 2024-06-06

## 2024-06-06 RX ORDER — MAGNESIUM HYDROXIDE/ALUMINUM HYDROXICE/SIMETHICONE 120; 1200; 1200 MG/30ML; MG/30ML; MG/30ML
30 SUSPENSION ORAL ONCE
Status: COMPLETED | OUTPATIENT
Start: 2024-06-06 | End: 2024-06-06

## 2024-06-06 RX ORDER — ACETAMINOPHEN 10 MG/ML
1000 INJECTION, SOLUTION INTRAVENOUS ONCE
Status: COMPLETED | OUTPATIENT
Start: 2024-06-06 | End: 2024-06-06

## 2024-06-06 RX ORDER — KETOROLAC TROMETHAMINE 30 MG/ML
15 INJECTION, SOLUTION INTRAMUSCULAR; INTRAVENOUS ONCE
Status: COMPLETED | OUTPATIENT
Start: 2024-06-06 | End: 2024-06-06

## 2024-06-06 RX ORDER — SUCRALFATE 1 G/1
1 TABLET ORAL 4 TIMES DAILY
Qty: 20 TABLET | Refills: 0 | Status: SHIPPED | OUTPATIENT
Start: 2024-06-06

## 2024-06-06 RX ORDER — ACETAMINOPHEN 325 MG/1
975 TABLET ORAL ONCE
Status: DISCONTINUED | OUTPATIENT
Start: 2024-06-06 | End: 2024-06-06

## 2024-06-06 RX ORDER — ONDANSETRON 4 MG/1
4 TABLET, ORALLY DISINTEGRATING ORAL EVERY 6 HOURS PRN
Qty: 20 TABLET | Refills: 0 | Status: SHIPPED | OUTPATIENT
Start: 2024-06-06

## 2024-06-06 RX ORDER — FAMOTIDINE 20 MG/1
20 TABLET, FILM COATED ORAL 2 TIMES DAILY
Qty: 30 TABLET | Refills: 0 | Status: SHIPPED | OUTPATIENT
Start: 2024-06-06

## 2024-06-06 RX ADMIN — ONDANSETRON 4 MG: 2 INJECTION INTRAMUSCULAR; INTRAVENOUS at 16:31

## 2024-06-06 RX ADMIN — IOHEXOL 100 ML: 350 INJECTION, SOLUTION INTRAVENOUS at 18:04

## 2024-06-06 RX ADMIN — KETOROLAC TROMETHAMINE 15 MG: 30 INJECTION, SOLUTION INTRAMUSCULAR; INTRAVENOUS at 18:16

## 2024-06-06 RX ADMIN — ACETAMINOPHEN 1000 MG: 10 INJECTION INTRAVENOUS at 16:33

## 2024-06-06 RX ADMIN — IOHEXOL 50 ML: 240 INJECTION, SOLUTION INTRATHECAL; INTRAVASCULAR; INTRAVENOUS; ORAL at 18:04

## 2024-06-06 RX ADMIN — ALUMINUM HYDROXIDE, MAGNESIUM HYDROXIDE, DIMETHICONE 30 ML: 400; 400; 40 SUSPENSION ORAL at 21:15

## 2024-06-06 NOTE — TELEPHONE ENCOUNTER
Pt states she had bariatric surgery w/ Dr. Solorio in 2019 and woke up with stabbing pain in the incision area, having some leaking in that area; told her Dr. Solorio is on vacation right now and transferred her to [facility].

## 2024-06-06 NOTE — ED ATTENDING ATTESTATION
6/6/2024  I, Renee Kat DO, saw and evaluated the patient. I have discussed the patient with the resident/non-physician practitioner and agree with the resident's/non-physician practitioner's findings, Plan of Care, and MDM as documented in the resident's/non-physician practitioner's note, except where noted. All available labs and Radiology studies were reviewed.  I was present for key portions of any procedure(s) performed by the resident/non-physician practitioner and I was immediately available to provide assistance.       At this point I agree with the current assessment done in the Emergency Department.  I have conducted an independent evaluation of this patient a history and physical is as follows:    ED Course  ED Course as of 06/06/24 2239   Thu Jun 06, 2024 2238 Updated pt on US result of dilated bile ducts and instructed pt to f/u with bariatric surgeon.  Will treat for gastritis.     44 y.o. F w/h/o RNY (Dr. Solorio, 2019), hysterectomy, laparotomy p/w LUQ pain x 15 hours.  Woke up from sleep due to pain. Sharp, stabbing, nonradiating, intermittent.  Pressing on it helps. Bearing down and coughing makes it worse.  Drank coffee which caused burning sensation.  Associated with nausea.  Had small BM today, but denies diarrhea or constipation.  She called her bariatric surgeon's office today who referred her to ER since surgeon is away on vacation.  Denies F/C, URI complaints, or urinary complaints.  On exam, pt in NAD.  Exquisite TTP to epigastric/LUQ area.  Plan: Labs, urine, CT scan, symptomatic control    Critical Care Time  Procedures

## 2024-06-06 NOTE — TELEPHONE ENCOUNTER
"Pt of Dr. Solorio, had RNY in 2019.    REASON FOR CALL:Abdominal pain, nausea    Pt calling, states she woke up with sharp pain in abdomen in the area of old RNY incision.  Throughout the morning pain became worse, and was aggravated with coughing and bending.  Pt feels nervous.  Pt states abdomen feels tender like it did after surgery.  Pt also C/O nausea, heart \"pounding\" and was 110/min per watch.   Pt states she feels something is very wrong.  RN advised ED for eval.  Pt verbalizes understanding.       Reason for Disposition   Patient sounds very sick or weak to the triager    Answer Assessment - Initial Assessment Questions  1. LOCATION: \"Where does it hurt?\"       Abdomen  2. RADIATION: \"Does the pain shoot anywhere else?\" (e.g., chest, back)      Denies  3. ONSET: \"When did the pain begin?\" (e.g., minutes, hours or days ago)       Today   4. SUDDEN: \"Gradual or sudden onset?\"      Sudden   5. PATTERN \"Does the pain come and go, or is it constant?\"     - If constant: \"Is it getting better, staying the same, or worsening?\"       (Note: Constant means the pain never goes away completely; most serious pain is constant and it progresses)      - If intermittent: \"How long does it last?\" \"Do you have pain now?\"      (Note: Intermittent means the pain goes away completely between bouts)      Intermittent   6. SEVERITY: \"How bad is the pain?\"  (e.g., Scale 1-10; mild, moderate, or severe)    - MILD (1-3): doesn't interfere with normal activities, abdomen soft and not tender to touch     - MODERATE (4-7): interferes with normal activities or awakens from sleep, tender to touch     - SEVERE (8-10): excruciating pain, doubled over, unable to do any normal activities       Severe when sharp pain happens  7. RECURRENT SYMPTOM: \"Have you ever had this type of stomach pain before?\" If Yes, ask: \"When was the last time?\" and \"What happened that time?\"       Similar to tenderness of incision after surgery and located in same " "area as incision.  8. CAUSE: \"What do you think is causing the stomach pain?\"      Unsure  9. RELIEVING/AGGRAVATING FACTORS: \"What makes it better or worse?\" (e.g., movement, antacids, bowel movement)      Denies  10. OTHER SYMPTOMS: \"Has there been any vomiting, diarrhea, constipation, or urine problems?\"        Nausea, heart pounding    Protocols used: Abdominal Pain - Female-ADULT-[place]    "

## 2024-06-12 NOTE — ED PROVIDER NOTES
History  Chief Complaint   Patient presents with    Abdominal Pain     Pt reports left sided abd pain and nausea since today.      Patient is a 43 yo female with pertinent PMH of gastric bypass and hysterectomy who presents for evaluation of left sided abdominal pain. Patient states she woke from sleep today due to sharp, stabbing pain in left abdomen near prior incision site of gastric bypass. Patient states the pain does not radiate and is intermittent. Patient has found that pressing on the area helps improve pain somewhat. She did experience worsening of pain today while bearing down to urinate. Patient states she has had poor appetite today 2/2 pain and nausea without vomiting. Did try to drink her morning coffee, which caused burning pain in her stomach. Patient states she had a small BM today; denies any diarrhea, blood in stool, or constipation.  Denies fever, chills, chest pain, SOB, cough, congestion, or urinary complaints. Per chart review, patient contacted bariatric surgery office today and expressed concern about symptoms. Was told to come to the ED as her surgeon, Dr. Solorio, was on vacation.         Prior to Admission Medications   Prescriptions Last Dose Informant Patient Reported? Taking?   Cholecalciferol 50 MCG (2000 UT) CAPS   Yes Yes   Sig: Take by mouth   NAPROXEN PO Not Taking  Yes No   Sig: Take by mouth   Patient not taking: Reported on 6/6/2024   Nascobal 500 MCG/0.1ML SOLN Not Taking  No No   Sig: USE 1 SPRAY IN ALTERNATING NOSTRIL ONCE WEEKLY   Patient not taking: Reported on 1/20/2023   albuterol (ProAir HFA) 90 mcg/act inhaler Not Taking  No No   Sig: Inhale 2 puffs every 6 (six) hours as needed for shortness of breath   Patient not taking: Reported on 1/20/2023   albuterol (ProAir HFA) 90 mcg/act inhaler   No Yes   Sig: Inhale 2 puffs every 6 (six) hours as needed for wheezing   benzonatate (TESSALON) 200 MG capsule Not Taking  No No   Sig: Take 1 capsule (200 mg total) by mouth 3  (three) times a day as needed for cough   Patient not taking: Reported on 2024   calcium acetate (PHOSLO) 667 mg capsule  Self Yes Yes   Sig: Take 1,334 mg by mouth in the morning   calcium acetate (PHOSLO) 667 mg capsule Not Taking  Yes No   Sig: Take 1,334 mg by mouth   Patient not taking: Reported on 2024   docusate sodium (COLACE) 100 mg capsule  Self Yes Yes   Sig: Take 100 mg by mouth   iron polysaccharides (NIFEREX) 150 mg capsule  Self Yes Yes   Sig: Take 150 mg by mouth 2 (two) times a day   multivitamin-minerals (CENTRUM) tablet  Self Yes Yes   Sig: Take 1 tablet by mouth   ondansetron (ZOFRAN) 4 mg tablet Not Taking  No No   Sig: Take 1 tablet (4 mg total) by mouth every 8 (eight) hours as needed for nausea or vomiting   Patient not taking: Reported on 2024   predniSONE 10 mg tablet Not Taking  No No   Si-5-4-3-2-1 taper with food.   Patient not taking: Reported on 2024      Facility-Administered Medications: None       Past Medical History:   Diagnosis Date    Migraines        Past Surgical History:   Procedure Laterality Date     SECTION      DILATION AND CURETTAGE OF UTERUS      GASTRIC BYPASS      HYSTERECTOMY      WISDOM TOOTH EXTRACTION         Family History   Problem Relation Age of Onset    Hypertension Mother     Cataracts Mother     Cancer Mother     Diabetes Father     Cancer Maternal Grandmother      I have reviewed and agree with the history as documented.    E-Cigarette/Vaping    E-Cigarette Use Never User      E-Cigarette/Vaping Substances     Social History     Tobacco Use    Smoking status: Never    Smokeless tobacco: Never   Vaping Use    Vaping status: Never Used   Substance Use Topics    Alcohol use: Yes     Comment: occasionally    Drug use: No        Review of Systems   All other systems reviewed and are negative.      Physical Exam  ED Triage Vitals   Temperature Pulse Respirations Blood Pressure SpO2   24 1515 24 1515 24 1515  06/06/24 1515 06/06/24 1515   98.3 °F (36.8 °C) 75 18 132/61 98 %      Temp src Heart Rate Source Patient Position - Orthostatic VS BP Location FiO2 (%)   -- 06/06/24 1515 06/06/24 1726 06/06/24 1515 --    Monitor Sitting Right arm       Pain Score       06/06/24 1816       7             Orthostatic Vital Signs  Vitals:    06/06/24 1515 06/06/24 1726   BP: 132/61 131/60   Pulse: 75 (!) 51   Patient Position - Orthostatic VS:  Sitting       Physical Exam  Constitutional:       General: She is not in acute distress.     Appearance: She is well-developed and normal weight. She is not ill-appearing, toxic-appearing or diaphoretic.   HENT:      Head: Normocephalic and atraumatic.      Mouth/Throat:      Mouth: Mucous membranes are moist.   Eyes:      Pupils: Pupils are equal, round, and reactive to light.   Cardiovascular:      Rate and Rhythm: Regular rhythm. Bradycardia present.      Heart sounds: Normal heart sounds.   Pulmonary:      Effort: Pulmonary effort is normal.   Abdominal:      General: Abdomen is flat. Bowel sounds are normal.      Palpations: Abdomen is soft.      Tenderness: There is abdominal tenderness in the epigastric area and left upper quadrant.      Hernia: No hernia is present.   Skin:     General: Skin is warm and dry.      Comments: Well healed abdominal incisions s/p rober en y gastric bypass     Neurological:      General: No focal deficit present.      Mental Status: She is alert.   Psychiatric:         Mood and Affect: Mood normal.         Behavior: Behavior normal.         ED Medications  Medications   ondansetron (ZOFRAN) injection 4 mg (4 mg Intravenous Given 6/6/24 1631)   acetaminophen (Ofirmev) injection 1,000 mg (0 mg Intravenous Stopped 6/6/24 1650)   iohexol (OMNIPAQUE) 350 MG/ML injection (MULTI-DOSE) 100 mL (100 mL Intravenous Given 6/6/24 1804)   iohexol (OMNIPAQUE) 240 MG/ML solution 50 mL (50 mL Oral Given 6/6/24 1804)   ketorolac (TORADOL) injection 15 mg (15 mg Intravenous  Given 6/6/24 1816)   aluminum-magnesium hydroxide-simethicone (MAALOX) oral suspension 30 mL (30 mL Oral Given 6/6/24 2115)       Diagnostic Studies  Results Reviewed       Procedure Component Value Units Date/Time    UA w Reflex to Microscopic w Reflex to Culture [[id number]] Collected: 06/06/24 1630    Lab Status: Final result Specimen: Urine, Clean Catch Updated: 06/06/24 1702     Color, UA Light Yellow     Clarity, UA Clear     Specific Gravity, UA 1.016     pH, UA 6.0     Leukocytes, UA Negative     Nitrite, UA Negative     Protein, UA Negative mg/dl      Glucose, UA Negative mg/dl      Ketones, UA Negative mg/dl      Urobilinogen, UA <2.0 mg/dl      Bilirubin, UA Negative     Occult Blood, UA Negative    Comprehensive metabolic panel [[id number]] Collected: 06/06/24 1630    Lab Status: Final result Specimen: Blood from Arm, Right Updated: 06/06/24 1657     Sodium 140 mmol/L      Potassium 4.5 mmol/L      Chloride 103 mmol/L      CO2 30 mmol/L      ANION GAP 7 mmol/L      BUN 11 mg/dL      Creatinine 0.64 mg/dL      Glucose 88 mg/dL      Calcium 9.5 mg/dL      AST 16 U/L      ALT 12 U/L      Alkaline Phosphatase 48 U/L      Total Protein 7.0 g/dL      Albumin 4.6 g/dL      Total Bilirubin 0.65 mg/dL      eGFR 108 ml/min/1.73sq m     Narrative:      National Kidney Disease Foundation guidelines for Chronic Kidney Disease (CKD):     Stage 1 with normal or high GFR (GFR > 90 mL/min/1.73 square meters)    Stage 2 Mild CKD (GFR = 60-89 mL/min/1.73 square meters)    Stage 3A Moderate CKD (GFR = 45-59 mL/min/1.73 square meters)    Stage 3B Moderate CKD (GFR = 30-44 mL/min/1.73 square meters)    Stage 4 Severe CKD (GFR = 15-29 mL/min/1.73 square meters)    Stage 5 End Stage CKD (GFR <15 mL/min/1.73 square meters)  Note: GFR calculation is accurate only with a steady state creatinine    Lipase [[id number]]  (Normal) Collected: 06/06/24 1630    Lab Status: Final result Specimen: Blood from Arm, Right Updated: 06/06/24 1657      Lipase 24 u/L     CBC and differential [[id number]]  (Abnormal) Collected: 06/06/24 1630    Lab Status: Final result Specimen: Blood from Arm, Right Updated: 06/06/24 1640     WBC 6.25 Thousand/uL      RBC 4.78 Million/uL      Hemoglobin 14.1 g/dL      Hematocrit 42.9 %      MCV 90 fL      MCH 29.5 pg      MCHC 32.9 g/dL      RDW 11.9 %      MPV 11.0 fL      Platelets 207 Thousands/uL      nRBC 0 /100 WBCs      Segmented % 42 %      Immature Grans % 0 %      Lymphocytes % 43 %      Monocytes % 9 %      Eosinophils Relative 5 %      Basophils Relative 1 %      Absolute Neutrophils 2.60 Thousands/µL      Absolute Immature Grans 0.01 Thousand/uL      Absolute Lymphocytes 2.75 Thousands/µL      Absolute Monocytes 0.55 Thousand/µL      Eosinophils Absolute 0.29 Thousand/µL      Basophils Absolute 0.05 Thousands/µL                    US right upper quadrant   Final Result by Yasir Cruz DO (06/06 2146)   No sonographic evidence of cholelithiasis or cholecystitis.   Mild extrahepatic biliary ductal dilatation measuring up to 7 mm. No visualized calculus. This could be followed with nonemergent right upper quadrant ultrasound to ensure resolution. Consideration to nonemergent MRI MRCP can also be given.         Workstation performed: [id number]         CT abdomen pelvis with contrast   Final Result by Derek Guthrie MD (06/06 1839)      Probable gastritis of the excluded part of the stomach in this patient status post gastric bypass. No anastomotic complication or obstruction. The oral contrast column has transited into the colon.      Mild intra and extrahepatic biliary dilation and gallbladder distention without radiopaque calculi. Follow-up with right upper quadrant ultrasound.      The study was marked in EPIC for immediate notification.         Workstation performed: [id number]               Procedures  Procedures      ED Course                                       Medical Decision Making  Arlin  RODOLFO Menjiavr is a 44 y.o. who presents with complaints of left sided abdominal pain     Vital signs are bradycardic, otherwise stable, afebrile  PE: TTP of LUQ and epigastric region    Ddx: cannot exclude post surgical complication vs. Other intraabdominal pathology     Plan:   Symptomatic treatment   blood work and UA WNL  CT showing gastritis of exclude portion of stomach and biliary findings without stones, recommended RUQ US  RUQ US showing extrahepatic biliary ductal dilatation up to 7 mm  Recommend follow up with Dr. Solorio for further evaluation of CT and US findings  Supportive care instructions provided for treatment of gastritis    Disposition: Patient stable for discharge. Return precautions provided. Patient understands and is agreeable to plan.       Amount and/or Complexity of Data Reviewed  Labs: ordered.  Radiology: ordered.    Risk  OTC drugs.  Prescription drug management.          Disposition  Final diagnoses:   Gastritis   Abdominal pain   History of bariatric surgery   Dilation of biliary tract   Nausea     Time reflects when diagnosis was documented in both MDM as applicable and the Disposition within this note       Time User Action Codes Description Comment    6/6/2024  9:52 PM Renee Kat L Add [K29.70] Gastritis     6/6/2024  9:52 PM Renee Kat L Add [R10.9] Abdominal pain     6/6/2024  9:52 PM [place], Crystal L Add [Z98.84] History of bariatric surgery     6/6/2024  9:54 PM [place] Crystal L Add [K83.8] Dilation of biliary tract     6/6/2024 10:37 PM [place] Crystal L Add [R11.0] Nausea           ED Disposition       ED Disposition   Discharge    Condition   Stable    Date/Time   Thu Jun 6, 2024 10:38 PM    Comment   Arlin REYNOLDS Otto discharge to home/self care.                   Follow-up Information       Follow up With Specialties Details Why Contact Info    Santhosh Solorio MD General Surgery Schedule an appointment as soon as possible for a visit  For follow up of CT scan and  ultrasound [address] [place] [id number]  [phone]              Discharge Medication List as of 6/6/2024 10:38 PM        START taking these medications    Details   famotidine (PEPCID) 20 mg tablet Take 1 tablet (20 mg total) by mouth 2 (two) times a day, Starting Thu 6/6/2024, Normal      ondansetron (Zofran ODT) 4 mg disintegrating tablet Take 1 tablet (4 mg total) by mouth every 6 (six) hours as needed for nausea or vomiting, Starting u 6/6/2024, Normal      sucralfate (CARAFATE) 1 g tablet Take 1 tablet (1 g total) by mouth 4 (four) times a day, Starting Thu 6/6/2024, Normal           CONTINUE these medications which have NOT CHANGED    Details   !! albuterol (ProAir HFA) 90 mcg/act inhaler Inhale 2 puffs every 6 (six) hours as needed for wheezing, Starting Sun 2/11/2024, Normal      !! calcium acetate (PHOSLO) 667 mg capsule Take 1,334 mg by mouth in the morning, Historical Med      Cholecalciferol 50 MCG (2000 UT) CAPS Take by mouth, Historical Med      docusate sodium (COLACE) 100 mg capsule Take 100 mg by mouth, Historical Med      iron polysaccharides (NIFEREX) 150 mg capsule Take 150 mg by mouth 2 (two) times a day, Historical Med      multivitamin-minerals (CENTRUM) tablet Take 1 tablet by mouth, Historical Med      !! albuterol (ProAir HFA) 90 mcg/act inhaler Inhale 2 puffs every 6 (six) hours as needed for shortness of breath, Starting Wed 12/29/2021, Normal      benzonatate (TESSALON) 200 MG capsule Take 1 capsule (200 mg total) by mouth 3 (three) times a day as needed for cough, Starting Sun 2/11/2024, Normal      !! calcium acetate (PHOSLO) 667 mg capsule Take 1,334 mg by mouth, Historical Med      NAPROXEN PO Take by mouth, Historical Med      Nascobal 500 MCG/0.1ML SOLN USE 1 SPRAY IN ALTERNATING NOSTRIL ONCE WEEKLY, Normal      ondansetron (ZOFRAN) 4 mg tablet Take 1 tablet (4 mg total) by mouth every 8 (eight) hours as needed for nausea or vomiting, Starting Fri 1/20/2023, Normal       predniSONE 10 mg tablet 6-5-4-3-2-1 taper with food., Normal       !! - Potential duplicate medications found. Please discuss with provider.        No discharge procedures on file.    PDMP Review       None             ED Provider  Attending physically available and evaluated Arlin Menjivar. I managed the patient along with the ED Attending.    Electronically Signed by           Judy Lanza MD  06/12/24 [id number]

## 2024-06-26 ENCOUNTER — OFFICE VISIT (OUTPATIENT)
Dept: SURGERY | Facility: CLINIC | Age: 44
End: 2024-06-26
Payer: COMMERCIAL

## 2024-06-26 VITALS
OXYGEN SATURATION: 97 % | HEART RATE: 83 BPM | WEIGHT: 164 LBS | TEMPERATURE: 97.2 F | HEIGHT: 69 IN | DIASTOLIC BLOOD PRESSURE: 64 MMHG | SYSTOLIC BLOOD PRESSURE: 126 MMHG | BODY MASS INDEX: 24.29 KG/M2

## 2024-06-26 DIAGNOSIS — R10.9 RIGHT FLANK PAIN: Primary | ICD-10-CM

## 2024-06-26 DIAGNOSIS — R10.13 EPIGASTRIC PAIN: ICD-10-CM

## 2024-06-26 DIAGNOSIS — K80.20 GALLSTONES: Primary | ICD-10-CM

## 2024-06-26 PROCEDURE — 99214 OFFICE O/P EST MOD 30 MIN: CPT | Performed by: SPECIALIST

## 2024-07-03 ENCOUNTER — HOSPITAL ENCOUNTER (OUTPATIENT)
Dept: NUCLEAR MEDICINE | Facility: HOSPITAL | Age: 44
Discharge: HOME | End: 2024-07-03
Payer: COMMERCIAL

## 2024-07-03 VITALS — WEIGHT: 158.73 LBS | BODY MASS INDEX: 23.44 KG/M2

## 2024-07-03 DIAGNOSIS — K80.20 GALLSTONES: ICD-10-CM

## 2024-07-03 PROCEDURE — 78227 HEPATOBIL SYST IMAGE W/DRUG: CPT

## 2024-07-03 PROCEDURE — A9537 TC99M MEBROFENIN: HCPCS

## 2024-07-03 RX ORDER — SINCALIDE 5 UG/5ML
0.02 INJECTION, POWDER, LYOPHILIZED, FOR SOLUTION INTRAVENOUS ONCE
Status: COMPLETED | OUTPATIENT
Start: 2024-07-03 | End: 2024-07-03

## 2024-07-03 RX ADMIN — SINCALIDE 1.4 MCG: 5 INJECTION, POWDER, LYOPHILIZED, FOR SOLUTION INTRAVENOUS at 13:11

## 2024-07-10 ENCOUNTER — TELEPHONE (OUTPATIENT)
Age: 44
End: 2024-07-10

## 2024-07-16 ENCOUNTER — OFFICE VISIT (OUTPATIENT)
Dept: SURGERY | Facility: CLINIC | Age: 44
End: 2024-07-16
Payer: COMMERCIAL

## 2024-07-16 VITALS
HEART RATE: 62 BPM | BODY MASS INDEX: 23.4 KG/M2 | HEIGHT: 69 IN | TEMPERATURE: 96.9 F | WEIGHT: 158 LBS | OXYGEN SATURATION: 99 %

## 2024-07-16 DIAGNOSIS — K81.9 ACALCULOUS CHOLECYSTITIS: Primary | ICD-10-CM

## 2024-07-16 PROCEDURE — 99213 OFFICE O/P EST LOW 20 MIN: CPT | Performed by: SPECIALIST

## 2024-07-16 NOTE — PROGRESS NOTES
"Chief Complaint: Epigastric and left upper quadrant abdominal pain.      History of Present Illness: Patient is a 44-year-old white female previous patient of ours on whom we performed a laparoscopic Deedee-en-Y gastric bypass in May 2018.  At that time she weighed 283 pounds and currently weighs 164 pounds.    She recently developed some left upper quadrant and epigastric abdominal pain that was quite severe in the area of her gastric bypass incision.  She apparently called the office here and was directed by the on-call people to the emergency room at [facility].  The workup included laboratory values which were essentially in normal.  She had a CAT scan that demonstrated mild intra and extrahepatic biliary ductal dilatation and gallbladder distention without radiopaque calculi.  Also \"probable gastritis \"in the gastric remnant.  She had a subsequent ultrasound that did not demonstrate gallstones or evidence of cholecystitis.  At that time she was referred to our office for our input and interpretation of her symptoms and workup.    Today she states that her pain was epigastric and also more right flank.  Her UA demonstrated no evidence of blood or oxalate.  CAT scan did not demonstrate ureteral or renal calculi.      Past Medical History:   Past Medical History:   Diagnosis Date    Migraines          Past Surgical History:    Past Surgical History:   Procedure Laterality Date     SECTION      DILATION AND CURETTAGE OF UTERUS      GASTRIC BYPASS      HYSTERECTOMY      WISDOM TOOTH EXTRACTION           Allergies:    Allergies   Allergen Reactions    Aspartame - Food Allergy Headache    Other Diarrhea     Sugar \"Dumping syndrome S/P Gastric Bypass\"          Medications:    Current Outpatient Medications:     calcium acetate (PHOSLO) 667 mg capsule, Take 1,334 mg by mouth in the morning (Patient not taking: Reported on 2024), Disp: , Rfl:     Cholecalciferol 50 MCG (2000) CAPS, Take by mouth, " Disp: , Rfl:     docusate sodium (COLACE) 100 mg capsule, Take 100 mg by mouth, Disp: , Rfl:     famotidine (PEPCID) 20 mg tablet, Take 1 tablet (20 mg total) by mouth 2 (two) times a day, Disp: 30 tablet, Rfl: 0    iron polysaccharides (NIFEREX) 150 mg capsule, Take 150 mg by mouth 2 (two) times a day, Disp: , Rfl:     multivitamin-minerals (CENTRUM) tablet, Take 1 tablet by mouth, Disp: , Rfl:     ondansetron (Zofran ODT) 4 mg disintegrating tablet, Take 1 tablet (4 mg total) by mouth every 6 (six) hours as needed for nausea or vomiting, Disp: 20 tablet, Rfl: 0    sucralfate (CARAFATE) 1 g tablet, Take 1 tablet (1 g total) by mouth 4 (four) times a day, Disp: 20 tablet, Rfl: 0    albuterol (ProAir HFA) 90 mcg/act inhaler, Inhale 2 puffs every 6 (six) hours as needed for shortness of breath (Patient not taking: Reported on 1/20/2023), Disp: 8.5 g, Rfl: 0    albuterol (ProAir HFA) 90 mcg/act inhaler, Inhale 2 puffs every 6 (six) hours as needed for wheezing (Patient not taking: Reported on 6/26/2024), Disp: 8.5 g, Rfl: 0    benzonatate (TESSALON) 200 MG capsule, Take 1 capsule (200 mg total) by mouth 3 (three) times a day as needed for cough (Patient not taking: Reported on 6/6/2024), Disp: 20 capsule, Rfl: 0    calcium acetate (PHOSLO) 667 mg capsule, Take 1,334 mg by mouth (Patient not taking: Reported on 2/11/2024), Disp: , Rfl:     NAPROXEN PO, Take by mouth (Patient not taking: Reported on 6/6/2024), Disp: , Rfl:     Nascobal 500 MCG/0.1ML SOLN, USE 1 SPRAY IN ALTERNATING NOSTRIL ONCE WEEKLY (Patient not taking: Reported on 1/20/2023), Disp: 4 mL, Rfl: 11    ondansetron (ZOFRAN) 4 mg tablet, Take 1 tablet (4 mg total) by mouth every 8 (eight) hours as needed for nausea or vomiting (Patient not taking: Reported on 2/11/2024), Disp: 20 tablet, Rfl: 0    predniSONE 10 mg tablet, 6-5-4-3-2-1 taper with food. (Patient not taking: Reported on 6/6/2024), Disp: 21 tablet, Rfl: 0      Social History:  Social History      Social History     Substance and Sexual Activity   Alcohol Use Yes    Comment: occasionally     Social History     Substance and Sexual Activity   Drug Use No     Social History     Tobacco Use   Smoking Status Never   Smokeless Tobacco Never         Family History:    Family History   Problem Relation Age of Onset    Hypertension Mother     Cataracts Mother     Cancer Mother     Diabetes Father     Cancer Maternal Grandmother          Review of Systems:    As per the HPI.  No recent weight loss weight gain fever chills night sweats chest pain nausea vomiting diarrhea constipation shortness of breath headaches blurry vision double vision sore throat chronic cough etc.    Vitals:  Vitals:    06/26/24 1231   BP: 126/64   Pulse: 83   Temp: (!) 97.2 °F (36.2 °C)   SpO2: 97%       Physical Exam:  Patient is a middle-aged white female 5 foot 9 inches 154 pounds.  She is awake alert no distress.    Vital signs as above    Skin warm dry  Head normocephalic lymphatic  Eyes PERRLA EOM intact  Ears nose within normal limits  Throat gag reflex intact  Neck no masses thyromegaly lymphadenopathy palpable.  Back no severe spinal tenderness  Lungs clear to A&P  Cor regular rate and rhythm no murmurs carotid bruits  Abdomen: Laparoscopic incisions are present with no evidence of incisional hernia.  Minimal tenderness is noted.  Extremities negative CCE  Neurologically ANO x 3 cranial nerves II to XII intact  Lymphatics no lymphadenopathy palpable.          Lab Results: I have personally reviewed pertinent reports. See below.  Imaging: I have personally reviewed pertinent imaging studies primarily CT scan of the abdomen pelvis and ultrasound.  EKG, Pathology, and Other Studies: I have personally reviewed pertinent reports.     No visits with results within 1 Day(s) from this visit.   Latest known visit with results is:   Admission on 06/06/2024, Discharged on 06/06/2024   Component Date Value    WBC 06/06/2024 6.25     RBC  06/06/2024 4.78     Hemoglobin 06/06/2024 14.1     Hematocrit 06/06/2024 42.9     MCV 06/06/2024 90     MCH 06/06/2024 29.5     MCHC 06/06/2024 32.9     RDW 06/06/2024 11.9     MPV 06/06/2024 11.0     Platelets 06/06/2024 207     nRBC 06/06/2024 0     Segmented % 06/06/2024 42 (L)     Immature Grans % 06/06/2024 0     Lymphocytes % 06/06/2024 43     Monocytes % 06/06/2024 9     Eosinophils Relative 06/06/2024 5     Basophils Relative 06/06/2024 1     Absolute Neutrophils 06/06/2024 2.60     Absolute Immature Grans 06/06/2024 0.01     Absolute Lymphocytes 06/06/2024 2.75     Absolute Monocytes 06/06/2024 0.55     Eosinophils Absolute 06/06/2024 0.29     Basophils Absolute 06/06/2024 0.05     Sodium 06/06/2024 140     Potassium 06/06/2024 4.5     Chloride 06/06/2024 103     CO2 06/06/2024 30     ANION GAP 06/06/2024 7     BUN 06/06/2024 11     Creatinine 06/06/2024 0.64     Glucose 06/06/2024 88     Calcium 06/06/2024 9.5     AST 06/06/2024 16     ALT 06/06/2024 12     Alkaline Phosphatase 06/06/2024 48     Total Protein 06/06/2024 7.0     Albumin 06/06/2024 4.6     Total Bilirubin 06/06/2024 0.65     eGFR 06/06/2024 108     Lipase 06/06/2024 24     Color, UA 06/06/2024 Light Yellow     Clarity, UA 06/06/2024 Clear     Specific Gravity, UA 06/06/2024 1.016     pH, UA 06/06/2024 6.0     Leukocytes, UA 06/06/2024 Negative     Nitrite, UA 06/06/2024 Negative     Protein, UA 06/06/2024 Negative     Glucose, UA 06/06/2024 Negative     Ketones, UA 06/06/2024 Negative     Urobilinogen, UA 06/06/2024 <2.0     Bilirubin, UA 06/06/2024 Negative     Occult Blood, UA 06/06/2024 Negative          Impression:  Abdominal pain right flank and epigastric.  CT scan and ultrasound negative for etiology.  Hold intra and extrahepatic biliary ductal dilatation on both studies with no evidence of biliary calculi.    Plan:  Will order HIDA scan with [facility].

## 2024-07-16 NOTE — PROGRESS NOTES
Arlin presents today with her  Cesar for follow-up visit in regards to abdominal pain.  She was seen in the emergency room at [facility] and a CAT scan and ultrasound were essentially unrevealing.  She was seen in our office she was referred for HIDA scan with [facility].    HIDA scan was read as normal although the ejection fraction was 80% which is near hypercontractile and also she had 5 out of 10 pain during the procedure.  Her  states that it was actually more than 5 out of 10 she was in significant discomfort on the way home.    She says that she has been fairly asymptomatic recently as she is adjusted her diet.  This is good and there is no rush to perform surgery if this is the case.    At this point she is discharged in the office.  She is told to monitor what her condition is.  If she maintains stability and is asymptomatic that is excellent.  If not the laparoscopic cholecystectomy with cholangiogram would be the operation of choice.  She will let us know.

## 2024-07-23 RX ORDER — ACETAMINOPHEN 325 MG/1
650 TABLET ORAL EVERY 6 HOURS PRN
COMMUNITY

## 2024-07-23 NOTE — PRE-PROCEDURE INSTRUCTIONS
Pre-Surgery Instructions:   Medication Instructions    acetaminophen (TYLENOL) 325 mg tablet Uses PRN- OK to take day of surgery    Cholecalciferol 50 MCG (2000 UT) CAPS Stop taking 7 days prior to surgery.    docusate sodium (COLACE) 100 mg capsule Uses PRN- OK to take day of surgery    iron polysaccharides (NIFEREX) 150 mg capsule Stop taking 7 days prior to surgery.    multivitamin-minerals (CENTRUM) tablet Stop taking 7 days prior to surgery.    ondansetron (Zofran ODT) 4 mg disintegrating tablet Uses PRN- OK to take day of surgery    Zinc Sulfate (ZINC 15 PO) Stop taking 7 days prior to surgery.     Pt verbalizes understanding of the following:    Please reference your “My Surgical Experience Booklet” for additional information to prepare for your upcoming surgery.      - DO NOT EAT OR DRINK ANYTHING after midnight on the evening before your procedure including coffee, tea, gum or hard candy.    - ONLY SIPS OF WATER with your medications are allowed on the morning of your procedure.  - Avoid OTC non-directed Vit/ Suppl/ Herbals 7 days prior to surgery to ensure no drug interactions with perioperative surgical/ anesthetic meds  - Avoid NSAIDs 3 days prior. Tylenol is ok to take as needed.   - Avoid ASA containing products 5 days prior, unless otherwise instructed by your provider     - Avoid alcohol 24hrs before your surgery.     - Follow the pre surgery showering instructions as listed in the “My Surgical Experience Booklet” or otherwise provided by your surgeon's office.  - Bathing instructions, will use dial  - No lotions, powders, sprays, deodorant, perfume, jewelry, body piercings, false lashes or make-up  - Do not use a blade to shave the surgical area 1 week before surgery. It is ok to use clean electric clippers up to 24 hours before surgery. Do not shave any body parts with a razor within 24hrs.  - Do not use dry shampoo, hair spray, hair gel, or any type of hair products.   - Remove nail polish,  including gel polish, and any artificial, gel, or acrylic nails if possible.    - For outpatient surgery, arrange for someone to drive you home after the procedure & stay with you until the next morning. Visitor guidelines discussed.   - Bring insurance cards & photo id    - Please remove your contact lens. Bring a case for your glasses (or contacts).  - Leave all valuables such as credit cards, money & jewelry at home  - Please bring any specially ordered equipment (sling, braces) if indicated.  - Wear causal clothing that is easy to take on and off. Consider your type of surgery.    - Notify surgeon if you develop any new illnesses, exposure, develop a rash/ open wounds or have additional questions prior to your surgery.    - Did the surgeon's office give you any other special instructions? no  - Did surgeon require any clearances? no    You will receive a call one business day prior to surgery with an arrival time and hospital directions. If your surgery is scheduled on a Monday, the hospital will be calling you on the Friday prior to your surgery.     Please confirm the visitor policy for the day of your procedure when you receive your phone call with an arrival time.

## 2024-07-26 ENCOUNTER — ANESTHESIA EVENT (OUTPATIENT)
Dept: PERIOP | Facility: HOSPITAL | Age: 44
End: 2024-07-26
Payer: COMMERCIAL

## 2024-07-28 PROCEDURE — NC001 PR NO CHARGE: Performed by: SPECIALIST

## 2024-07-28 NOTE — H&P
"Chief Complaint: Epigastric and left upper quadrant abdominal pain.        History of Present Illness: Patient is a 44-year-old white female previous patient of ours on whom we performed a laparoscopic Deedee-en-Y gastric bypass in May 2018.  At that time she weighed 283 pounds and currently weighs 164 pounds.     She recently developed some left upper quadrant and epigastric abdominal pain that was quite severe in the area of her gastric bypass incision.  She apparently called the office here and was directed by the on-call people to the emergency room at [facility].  The workup included laboratory values which were essentially in normal.  She had a CAT scan that demonstrated mild intra and extrahepatic biliary ductal dilatation and gallbladder distention without radiopaque calculi.  Also \"probable gastritis \"in the gastric remnant.  She had a subsequent ultrasound that did not demonstrate gallstones or evidence of cholecystitis.  At that time she was referred to our office for our input and interpretation of her symptoms and workup.     Today she states that her pain was epigastric and also more right flank.  Her UA demonstrated no evidence of blood or oxalate.  CAT scan did not demonstrate ureteral or renal calculi.        Past Medical History:   Medical History        Past Medical History:   Diagnosis Date    Migraines                 Past Surgical History:    Surgical History         Past Surgical History:   Procedure Laterality Date     SECTION        DILATION AND CURETTAGE OF UTERUS        GASTRIC BYPASS        HYSTERECTOMY        WISDOM TOOTH EXTRACTION                   Allergies:    Allergies         Allergies   Allergen Reactions    Aspartame - Food Allergy Headache    Other Diarrhea       Sugar \"Dumping syndrome S/P Gastric Bypass\"                Medications:    Current Medications   Current Outpatient Medications:     calcium acetate (PHOSLO) 667 mg capsule, Take 1,334 mg by mouth in the " morning (Patient not taking: Reported on 7/16/2024), Disp: , Rfl:     Cholecalciferol 50 MCG (2000 UT) CAPS, Take by mouth, Disp: , Rfl:     docusate sodium (COLACE) 100 mg capsule, Take 100 mg by mouth, Disp: , Rfl:     famotidine (PEPCID) 20 mg tablet, Take 1 tablet (20 mg total) by mouth 2 (two) times a day, Disp: 30 tablet, Rfl: 0    iron polysaccharides (NIFEREX) 150 mg capsule, Take 150 mg by mouth 2 (two) times a day, Disp: , Rfl:     multivitamin-minerals (CENTRUM) tablet, Take 1 tablet by mouth, Disp: , Rfl:     ondansetron (Zofran ODT) 4 mg disintegrating tablet, Take 1 tablet (4 mg total) by mouth every 6 (six) hours as needed for nausea or vomiting, Disp: 20 tablet, Rfl: 0    sucralfate (CARAFATE) 1 g tablet, Take 1 tablet (1 g total) by mouth 4 (four) times a day, Disp: 20 tablet, Rfl: 0    albuterol (ProAir HFA) 90 mcg/act inhaler, Inhale 2 puffs every 6 (six) hours as needed for shortness of breath (Patient not taking: Reported on 1/20/2023), Disp: 8.5 g, Rfl: 0    albuterol (ProAir HFA) 90 mcg/act inhaler, Inhale 2 puffs every 6 (six) hours as needed for wheezing (Patient not taking: Reported on 6/26/2024), Disp: 8.5 g, Rfl: 0    benzonatate (TESSALON) 200 MG capsule, Take 1 capsule (200 mg total) by mouth 3 (three) times a day as needed for cough (Patient not taking: Reported on 6/6/2024), Disp: 20 capsule, Rfl: 0    calcium acetate (PHOSLO) 667 mg capsule, Take 1,334 mg by mouth (Patient not taking: Reported on 2/11/2024), Disp: , Rfl:     NAPROXEN PO, Take by mouth (Patient not taking: Reported on 6/6/2024), Disp: , Rfl:     Nascobal 500 MCG/0.1ML SOLN, USE 1 SPRAY IN ALTERNATING NOSTRIL ONCE WEEKLY (Patient not taking: Reported on 1/20/2023), Disp: 4 mL, Rfl: 11    ondansetron (ZOFRAN) 4 mg tablet, Take 1 tablet (4 mg total) by mouth every 8 (eight) hours as needed for nausea or vomiting (Patient not taking: Reported on 2/11/2024), Disp: 20 tablet, Rfl: 0    predniSONE 10 mg tablet, 6-5-4-3-2-1  taper with food. (Patient not taking: Reported on 6/6/2024), Disp: 21 tablet, Rfl: 0           Social History:    Social History[]Expand by Default  Social History           Substance and Sexual Activity   Alcohol Use Yes     Comment: occasionally      Social History          Substance and Sexual Activity   Drug Use No      Tobacco Use History   Social History          Tobacco Use   Smoking Status Never   Smokeless Tobacco Never               Family History:    Family History         Family History   Problem Relation Age of Onset    Hypertension Mother      Cataracts Mother      Cancer Mother      Diabetes Father      Cancer Maternal Grandmother                 Review of Systems:    As per the HPI.  No recent weight loss weight gain fever chills night sweats chest pain nausea vomiting diarrhea constipation shortness of breath headaches blurry vision double vision sore throat chronic cough etc.     Vitals:      Vitals:     06/26/24 1231   BP: 126/64   Pulse: 83   Temp: (!) 97.2 °F (36.2 °C)   SpO2: 97%         Physical Exam:  Patient is a middle-aged white female 5 foot 9 inches 154 pounds.  She is awake alert no distress.     Vital signs as above     Skin warm dry  Head normocephalic lymphatic  Eyes PERRLA EOM intact  Ears nose within normal limits  Throat gag reflex intact  Neck no masses thyromegaly lymphadenopathy palpable.  Back no severe spinal tenderness  Lungs clear to A&P  Cor regular rate and rhythm no murmurs carotid bruits  Abdomen: Laparoscopic incisions are present with no evidence of incisional hernia.  Minimal tenderness is noted.  Extremities negative CCE  Neurologically ANO x 3 cranial nerves II to XII intact  Lymphatics no lymphadenopathy palpable.              Lab Results: I have personally reviewed pertinent reports. See below.  Imaging: I have personally reviewed pertinent imaging studies primarily CT scan of the abdomen pelvis and ultrasound.  EKG, Pathology, and Other Studies: I have  personally reviewed pertinent reports.            No visits with results within 1 Day(s) from this visit.   Latest known visit with results is:   Admission on 06/06/2024, Discharged on 06/06/2024   Component Date Value    WBC 06/06/2024 6.25     RBC 06/06/2024 4.78     Hemoglobin 06/06/2024 14.1     Hematocrit 06/06/2024 42.9     MCV 06/06/2024 90     MCH 06/06/2024 29.5     MCHC 06/06/2024 32.9     RDW 06/06/2024 11.9     MPV 06/06/2024 11.0     Platelets 06/06/2024 207     nRBC 06/06/2024 0     Segmented % 06/06/2024 42 (L)     Immature Grans % 06/06/2024 0     Lymphocytes % 06/06/2024 43     Monocytes % 06/06/2024 9     Eosinophils Relative 06/06/2024 5     Basophils Relative 06/06/2024 1     Absolute Neutrophils 06/06/2024 2.60     Absolute Immature Grans 06/06/2024 0.01     Absolute Lymphocytes 06/06/2024 2.75     Absolute Monocytes 06/06/2024 0.55     Eosinophils Absolute 06/06/2024 0.29     Basophils Absolute 06/06/2024 0.05     Sodium 06/06/2024 140     Potassium 06/06/2024 4.5     Chloride 06/06/2024 103     CO2 06/06/2024 30     ANION GAP 06/06/2024 7     BUN 06/06/2024 11     Creatinine 06/06/2024 0.64     Glucose 06/06/2024 88     Calcium 06/06/2024 9.5     AST 06/06/2024 16     ALT 06/06/2024 12     Alkaline Phosphatase 06/06/2024 48     Total Protein 06/06/2024 7.0     Albumin 06/06/2024 4.6     Total Bilirubin 06/06/2024 0.65     eGFR 06/06/2024 108     Lipase 06/06/2024 24     Color, UA 06/06/2024 Light Yellow     Clarity, UA 06/06/2024 Clear     Specific Gravity, UA 06/06/2024 1.016     pH, UA 06/06/2024 6.0     Leukocytes, UA 06/06/2024 Negative     Nitrite, UA 06/06/2024 Negative     Protein, UA 06/06/2024 Negative     Glucose, UA 06/06/2024 Negative     Ketones, UA 06/06/2024 Negative     Urobilinogen, UA 06/06/2024 <2.0     Bilirubin, UA 06/06/2024 Negative     Occult Blood, UA 06/06/2024 Negative             Impression:  Abdominal pain right flank and epigastric.  CT scan and ultrasound  negative for etiology.  Hold intra and extrahepatic biliary ductal dilatation on both studies with no evidence of biliary calculi.     Plan:  Will order HIDA scan with [facility].                    Inst

## 2024-07-29 ENCOUNTER — APPOINTMENT (OUTPATIENT)
Dept: RADIOLOGY | Facility: HOSPITAL | Age: 44
End: 2024-07-29
Payer: COMMERCIAL

## 2024-07-29 ENCOUNTER — HOSPITAL ENCOUNTER (OUTPATIENT)
Facility: HOSPITAL | Age: 44
Setting detail: OUTPATIENT SURGERY
Discharge: HOME | End: 2024-07-29
Attending: SPECIALIST | Admitting: SPECIALIST
Payer: COMMERCIAL

## 2024-07-29 ENCOUNTER — ANESTHESIA (OUTPATIENT)
Dept: PERIOP | Facility: HOSPITAL | Age: 44
End: 2024-07-29
Payer: COMMERCIAL

## 2024-07-29 VITALS
OXYGEN SATURATION: 99 % | DIASTOLIC BLOOD PRESSURE: 64 MMHG | WEIGHT: 166 LBS | BODY MASS INDEX: 24.59 KG/M2 | RESPIRATION RATE: 14 BRPM | HEIGHT: 69 IN | HEART RATE: 64 BPM | SYSTOLIC BLOOD PRESSURE: 81 MMHG | TEMPERATURE: 97.7 F

## 2024-07-29 DIAGNOSIS — K81.9 ACALCULOUS CHOLECYSTITIS: Primary | ICD-10-CM

## 2024-07-29 PROBLEM — R11.2 PONV (POSTOPERATIVE NAUSEA AND VOMITING): Status: ACTIVE | Noted: 2024-07-29

## 2024-07-29 PROBLEM — Z98.890 PONV (POSTOPERATIVE NAUSEA AND VOMITING): Status: ACTIVE | Noted: 2024-07-29

## 2024-07-29 PROCEDURE — 74300 X-RAY BILE DUCTS/PANCREAS: CPT

## 2024-07-29 PROCEDURE — 88304 TISSUE EXAM BY PATHOLOGIST: CPT | Performed by: PATHOLOGY

## 2024-07-29 PROCEDURE — 47563 LAPARO CHOLECYSTECTOMY/GRAPH: CPT | Performed by: SPECIALIST

## 2024-07-29 RX ORDER — FENTANYL CITRATE 50 UG/ML
INJECTION, SOLUTION INTRAMUSCULAR; INTRAVENOUS AS NEEDED
Status: DISCONTINUED | OUTPATIENT
Start: 2024-07-29 | End: 2024-07-29

## 2024-07-29 RX ORDER — ROCURONIUM BROMIDE 10 MG/ML
INJECTION, SOLUTION INTRAVENOUS AS NEEDED
Status: DISCONTINUED | OUTPATIENT
Start: 2024-07-29 | End: 2024-07-29

## 2024-07-29 RX ORDER — ONDANSETRON 2 MG/ML
4 INJECTION INTRAMUSCULAR; INTRAVENOUS EVERY 8 HOURS PRN
Status: DISCONTINUED | OUTPATIENT
Start: 2024-07-29 | End: 2024-07-29 | Stop reason: HOSPADM

## 2024-07-29 RX ORDER — SODIUM CHLORIDE, SODIUM LACTATE, POTASSIUM CHLORIDE, CALCIUM CHLORIDE 600; 310; 30; 20 MG/100ML; MG/100ML; MG/100ML; MG/100ML
125 INJECTION, SOLUTION INTRAVENOUS CONTINUOUS
Status: DISCONTINUED | OUTPATIENT
Start: 2024-07-29 | End: 2024-07-29 | Stop reason: HOSPADM

## 2024-07-29 RX ORDER — GLYCOPYRROLATE 0.2 MG/ML
INJECTION INTRAMUSCULAR; INTRAVENOUS AS NEEDED
Status: DISCONTINUED | OUTPATIENT
Start: 2024-07-29 | End: 2024-07-29

## 2024-07-29 RX ORDER — HEPARIN SODIUM 5000 [USP'U]/ML
5000 INJECTION, SOLUTION INTRAVENOUS; SUBCUTANEOUS EVERY 8 HOURS SCHEDULED
Status: DISCONTINUED | OUTPATIENT
Start: 2024-07-29 | End: 2024-07-29 | Stop reason: HOSPADM

## 2024-07-29 RX ORDER — ONDANSETRON 2 MG/ML
4 INJECTION INTRAMUSCULAR; INTRAVENOUS ONCE AS NEEDED
Status: COMPLETED | OUTPATIENT
Start: 2024-07-29 | End: 2024-07-29

## 2024-07-29 RX ORDER — ACETAMINOPHEN 10 MG/ML
INJECTION, SOLUTION INTRAVENOUS AS NEEDED
Status: DISCONTINUED | OUTPATIENT
Start: 2024-07-29 | End: 2024-07-29

## 2024-07-29 RX ORDER — DEXAMETHASONE SODIUM PHOSPHATE 10 MG/ML
INJECTION, SOLUTION INTRAMUSCULAR; INTRAVENOUS AS NEEDED
Status: DISCONTINUED | OUTPATIENT
Start: 2024-07-29 | End: 2024-07-29

## 2024-07-29 RX ORDER — KETOROLAC TROMETHAMINE 30 MG/ML
INJECTION, SOLUTION INTRAMUSCULAR; INTRAVENOUS AS NEEDED
Status: DISCONTINUED | OUTPATIENT
Start: 2024-07-29 | End: 2024-07-29

## 2024-07-29 RX ORDER — LIDOCAINE HYDROCHLORIDE 10 MG/ML
INJECTION, SOLUTION EPIDURAL; INFILTRATION; INTRACAUDAL; PERINEURAL AS NEEDED
Status: DISCONTINUED | OUTPATIENT
Start: 2024-07-29 | End: 2024-07-29

## 2024-07-29 RX ORDER — MIDAZOLAM HYDROCHLORIDE 2 MG/2ML
INJECTION, SOLUTION INTRAMUSCULAR; INTRAVENOUS AS NEEDED
Status: DISCONTINUED | OUTPATIENT
Start: 2024-07-29 | End: 2024-07-29

## 2024-07-29 RX ORDER — SCOLOPAMINE TRANSDERMAL SYSTEM 1 MG/1
1 PATCH, EXTENDED RELEASE TRANSDERMAL
Status: DISCONTINUED | OUTPATIENT
Start: 2024-07-29 | End: 2024-07-29 | Stop reason: HOSPADM

## 2024-07-29 RX ORDER — NEOSTIGMINE METHYLSULFATE 1 MG/ML
INJECTION INTRAVENOUS AS NEEDED
Status: DISCONTINUED | OUTPATIENT
Start: 2024-07-29 | End: 2024-07-29

## 2024-07-29 RX ORDER — PROPOFOL 10 MG/ML
INJECTION, EMULSION INTRAVENOUS AS NEEDED
Status: DISCONTINUED | OUTPATIENT
Start: 2024-07-29 | End: 2024-07-29

## 2024-07-29 RX ORDER — HYDROMORPHONE HCL/PF 1 MG/ML
0.5 SYRINGE (ML) INJECTION
Status: DISCONTINUED | OUTPATIENT
Start: 2024-07-29 | End: 2024-07-29 | Stop reason: HOSPADM

## 2024-07-29 RX ORDER — OXYCODONE HYDROCHLORIDE AND ACETAMINOPHEN 5; 325 MG/1; MG/1
1 TABLET ORAL EVERY 4 HOURS PRN
Status: DISCONTINUED | OUTPATIENT
Start: 2024-07-29 | End: 2024-07-29 | Stop reason: HOSPADM

## 2024-07-29 RX ORDER — PROPOFOL 10 MG/ML
INJECTION, EMULSION INTRAVENOUS CONTINUOUS PRN
Status: DISCONTINUED | OUTPATIENT
Start: 2024-07-29 | End: 2024-07-29

## 2024-07-29 RX ORDER — SODIUM CHLORIDE, SODIUM LACTATE, POTASSIUM CHLORIDE, CALCIUM CHLORIDE 600; 310; 30; 20 MG/100ML; MG/100ML; MG/100ML; MG/100ML
100 INJECTION, SOLUTION INTRAVENOUS CONTINUOUS
Status: DISCONTINUED | OUTPATIENT
Start: 2024-07-29 | End: 2024-07-29 | Stop reason: HOSPADM

## 2024-07-29 RX ORDER — ONDANSETRON 2 MG/ML
INJECTION INTRAMUSCULAR; INTRAVENOUS AS NEEDED
Status: DISCONTINUED | OUTPATIENT
Start: 2024-07-29 | End: 2024-07-29

## 2024-07-29 RX ORDER — CEFAZOLIN SODIUM 2 G/50ML
2000 SOLUTION INTRAVENOUS ONCE
Status: COMPLETED | OUTPATIENT
Start: 2024-07-29 | End: 2024-07-29

## 2024-07-29 RX ORDER — OXYCODONE HYDROCHLORIDE AND ACETAMINOPHEN 5; 325 MG/1; MG/1
1 TABLET ORAL EVERY 6 HOURS PRN
Qty: 20 TABLET | Refills: 0 | Status: SHIPPED | OUTPATIENT
Start: 2024-07-29 | End: 2024-08-08

## 2024-07-29 RX ORDER — MAGNESIUM HYDROXIDE 1200 MG/15ML
LIQUID ORAL AS NEEDED
Status: DISCONTINUED | OUTPATIENT
Start: 2024-07-29 | End: 2024-07-29 | Stop reason: HOSPADM

## 2024-07-29 RX ORDER — SODIUM CHLORIDE 9 MG/ML
INJECTION, SOLUTION INTRAVENOUS AS NEEDED
Status: DISCONTINUED | OUTPATIENT
Start: 2024-07-29 | End: 2024-07-29 | Stop reason: HOSPADM

## 2024-07-29 RX ORDER — BUPIVACAINE HYDROCHLORIDE 5 MG/ML
INJECTION, SOLUTION EPIDURAL; INTRACAUDAL AS NEEDED
Status: DISCONTINUED | OUTPATIENT
Start: 2024-07-29 | End: 2024-07-29 | Stop reason: HOSPADM

## 2024-07-29 RX ORDER — LORAZEPAM 2 MG/ML
1 INJECTION INTRAMUSCULAR ONCE
Status: DISCONTINUED | OUTPATIENT
Start: 2024-07-29 | End: 2024-07-29 | Stop reason: HOSPADM

## 2024-07-29 RX ORDER — FENTANYL CITRATE/PF 50 MCG/ML
25 SYRINGE (ML) INJECTION
Status: DISCONTINUED | OUTPATIENT
Start: 2024-07-29 | End: 2024-07-29 | Stop reason: HOSPADM

## 2024-07-29 RX ADMIN — ROCURONIUM BROMIDE 10 MG: 10 INJECTION INTRAVENOUS at 08:08

## 2024-07-29 RX ADMIN — LIDOCAINE HYDROCHLORIDE 50 MG: 10 INJECTION, SOLUTION EPIDURAL; INFILTRATION; INTRACAUDAL; PERINEURAL at 07:26

## 2024-07-29 RX ADMIN — FENTANYL CITRATE 50 MCG: 50 INJECTION, SOLUTION INTRAMUSCULAR; INTRAVENOUS at 07:24

## 2024-07-29 RX ADMIN — SODIUM CHLORIDE, SODIUM LACTATE, POTASSIUM CHLORIDE, AND CALCIUM CHLORIDE 100 ML/HR: .6; .31; .03; .02 INJECTION, SOLUTION INTRAVENOUS at 06:33

## 2024-07-29 RX ADMIN — OXYCODONE HYDROCHLORIDE AND ACETAMINOPHEN 1 TABLET: 5; 325 TABLET ORAL at 11:14

## 2024-07-29 RX ADMIN — ROCURONIUM BROMIDE 50 MG: 10 INJECTION INTRAVENOUS at 07:27

## 2024-07-29 RX ADMIN — MIDAZOLAM 2 MG: 1 INJECTION INTRAMUSCULAR; INTRAVENOUS at 07:22

## 2024-07-29 RX ADMIN — KETOROLAC TROMETHAMINE 30 MG: 30 INJECTION, SOLUTION INTRAMUSCULAR; INTRAVENOUS at 08:50

## 2024-07-29 RX ADMIN — ONDANSETRON 4 MG: 2 INJECTION INTRAMUSCULAR; INTRAVENOUS at 07:30

## 2024-07-29 RX ADMIN — CEFAZOLIN SODIUM 2000 MG: 2 SOLUTION INTRAVENOUS at 07:23

## 2024-07-29 RX ADMIN — SODIUM CHLORIDE, SODIUM LACTATE, POTASSIUM CHLORIDE, AND CALCIUM CHLORIDE: .6; .31; .03; .02 INJECTION, SOLUTION INTRAVENOUS at 07:22

## 2024-07-29 RX ADMIN — ACETAMINOPHEN 1000 MG: 10 INJECTION INTRAVENOUS at 07:41

## 2024-07-29 RX ADMIN — PROPOFOL 200 MG: 10 INJECTION, EMULSION INTRAVENOUS at 07:26

## 2024-07-29 RX ADMIN — GLYCOPYRROLATE 0.2 MG: 0.2 INJECTION INTRAMUSCULAR; INTRAVENOUS at 08:02

## 2024-07-29 RX ADMIN — PROPOFOL 100 MCG/KG/MIN: 10 INJECTION, EMULSION INTRAVENOUS at 07:34

## 2024-07-29 RX ADMIN — NEOSTIGMINE METHYLSULFATE 3 MG: 1 INJECTION INTRAVENOUS at 08:54

## 2024-07-29 RX ADMIN — DEXAMETHASONE SODIUM PHOSPHATE 10 MG: 10 INJECTION, SOLUTION INTRAMUSCULAR; INTRAVENOUS at 07:30

## 2024-07-29 RX ADMIN — GLYCOPYRROLATE 0.4 MG: 0.2 INJECTION INTRAMUSCULAR; INTRAVENOUS at 08:54

## 2024-07-29 RX ADMIN — HEPARIN SODIUM 5000 UNITS: 5000 INJECTION INTRAVENOUS; SUBCUTANEOUS at 06:28

## 2024-07-29 RX ADMIN — SODIUM CHLORIDE, SODIUM LACTATE, POTASSIUM CHLORIDE, AND CALCIUM CHLORIDE 100 ML/HR: .6; .31; .03; .02 INJECTION, SOLUTION INTRAVENOUS at 11:14

## 2024-07-29 RX ADMIN — ROCURONIUM BROMIDE 10 MG: 10 INJECTION INTRAVENOUS at 07:53

## 2024-07-29 RX ADMIN — FENTANYL CITRATE 50 MCG: 50 INJECTION, SOLUTION INTRAMUSCULAR; INTRAVENOUS at 07:51

## 2024-07-29 RX ADMIN — ONDANSETRON 4 MG: 2 INJECTION INTRAMUSCULAR; INTRAVENOUS at 09:35

## 2024-07-29 RX ADMIN — SCOPALAMINE 1 PATCH: 1 PATCH, EXTENDED RELEASE TRANSDERMAL at 06:28

## 2024-07-29 NOTE — NURSING NOTE
Sleepy but wants to try to eat something before getting any pain med due to history of PONV. Slight nausea presently. No noted distress.

## 2024-07-29 NOTE — ANESTHESIA POSTPROCEDURE EVALUATION
Post-Op Assessment Note    CV Status:  Stable  Pain Score: 0    Pain management: adequate       Mental Status:  Alert and awake   Hydration Status:  Euvolemic   PONV Controlled:  Controlled   Airway Patency:  Patent     Post Op Vitals Reviewed: Yes    No anethesia notable event occurred.    Staff: Anesthesiologist, CRNA           BP   136/83   Temp   98   Pulse  67   Resp   14   SpO2   98%

## 2024-07-29 NOTE — NURSING NOTE
Dr. Solorio was here to speak with patient and her . Discharge instructions were given written and verbally. No distress.

## 2024-07-29 NOTE — ANESTHESIA PREPROCEDURE EVALUATION
Medical History    History Comments   PONV (postoperative nausea and vomiting)    Migraines    Wears contact lenses    Wears glasses    Factor 5 Leiden mutation, heterozygous (HCC) when pregnant   Arthritis arthritis   Procedure:  LAPAROSCOPIC CHOLECYSTECTOMY  WITH CHOLANGIOGRAM (Abdomen)    Relevant Problems   ANESTHESIA   (+) PONV (postoperative nausea and vomiting)        Physical Exam    Airway    Mallampati score: II  TM Distance: >3 FB  Neck ROM: full     Dental       Cardiovascular  Rate: normal    Pulmonary  Pulmonary exam normal     Other Findings  Per pt denies anything remaining that is loose or removeablepost-pubertal.      Anesthesia Plan  ASA Score- 2     Anesthesia Type- general with ASA Monitors.         Additional Monitors:     Airway Plan: ETT.           Plan Factors-Exercise tolerance (METS): >4 METS.    Chart reviewed.    Patient summary reviewed.    Patient is not a current smoker.              Induction- intravenous.    Postoperative Plan- Plan for postoperative opioid use.         Informed Consent- Anesthetic plan and risks discussed with patient.  I personally reviewed this patient with the CRNA. Discussed and agreed on the Anesthesia Plan with the CRNA..

## 2024-07-29 NOTE — OP NOTE
OPERATIVE REPORT  PATIENT NAME: Arlin Menjivar    :  1980  MRN: [id number]  Pt Location:  OR ROOM 11    SURGERY DATE: 2024    Surgeons and Role:     * Santhosh Solorio MD - Primary    Preop Diagnosis:  Acalculous cholecystitis [K81.9]    Post-Op Diagnosis Codes:     * Acalculous cholecystitis [K81.9]    Procedure(s):  LAPAROSCOPIC CHOLECYSTECTOMY  WITH CHOLANGIOGRAM    Specimen(s):  ID Type Source Tests Collected by Time Destination   1 :  Tissue Gallbladder TISSUE EXAM Santhosh Solorio MD 2024  7:09 AM        Estimated Blood Loss:   Minimal    Drains:  * No LDAs found *    Anesthesia Type:   General    Operative Indications:  Acalculous cholecystitis [K81.9]      Operative Findings:  Severe dense adhesions to an inflamed gallbladder.  Gallbladder distended elongated and sigmoid shaped folded upon itself on 2 different areas.  Cholangiogram with a dilated common bile duct but no evidence of obstruction or choledocholithiasis.      Complications:   None    Procedure and Technique:  The patient brought the operating room placed [place] table supine position.  Under adequate general endotracheal anesthesia the abdomen was prepped and draped in usual sterile fashion.    A small incision was made in the umbilicus the Veress needle was inserted.  The abdomen is insufflated with CO2 to 15 mmHg.  Needle was removed and the 10 mm port is inserted.  The 10 mm scope was inserted and the abdomen was visually explored.  There is noted to be no trauma from needle port placement.  Additional ports were placed in the subxiphoid right subcostal and right upper quadrant areas.  These were 5 mm ports placed under direct vision.  Gallbladder was quite visible protruding from underneath the liver.  It was erythematous.  It was grasped at the fundus and retracted to the right subphrenic area.  Once again it was quite elongated distended and redundant.  It formed a sigmoidal S shaped as it is folded upon itself.  There  were dense adhesions to the anterior portion of the gallbladder from the surrounding viscera including the omentum of the duodenum.  These adhesions were once again quite dense and these were taken methodically off the gallbladder using blunt sharp and Bovie dissection.  Eventually the infundibulum was identified and once again this was folded upon itself.  It was grasped and retracted laterally.  Distal end of the gallbladder was dissected of the dense adhesions and eventually the cystic duct was identified.  It was quite long and elongated as was the gallbladder.  It was circumferentially dissected with the [place] dissector.  The artery was seen just superior and medial to this.  The duct was clipped high with a clip and then a small cut was made in the cystic duct.  An Angiocath was passed through the right subcostal area under direct vision.  A contra cath was passed through this.  It was passed through the hole in the cystic duct and clipped in place.  A cholangiogram was performed and this showed excellent filling proximally and distally with good flow of the duodenum.  The common duct appeared to be dilated along with the proximal biliary radicles but there was excellent flow with no evidence of filling defects.  After completing he can climb    The catheter was removed and the cystic duct was doubly clipped and then divided.  The artery was once again just superior medial to this and it was dealt with in a likewise fashion.  The gallbladder was taken out from the liver bed without difficulty using the hook cautery.  Was brought out of the umbilical port site and then sent to pathology histological diagnosis.  The operative site was inspected for bleeding no bleeding was noted.  There was copiously irrigated with saline solution this was suctioned from the field.  After adequate hemostasis was obtained the clips were inspected and noted to be intact and functional.  At that point any residual fluid and CO2  were removed from the abdominal cavity.  Fascial defect the umbilicus is closed with an 0 Vicryl suture.  All ports infiltrated with half percent Marcaine.  The skin is closed with 4 Monocryl in subsequent fashion.  Benzoin and Steri-Strips were applied.  The estimated blood loss was minimal.  The patient tolerated the procedure well delivered recovery room in stable condition.  Biju GUADALUPE was present for the entire procedure.    Patient Disposition:  PACU         SIGNATURE: Santhosh Solorio MD  DATE: July 29, 2024  TIME: 3:10 PM

## 2024-07-31 PROCEDURE — 88304 TISSUE EXAM BY PATHOLOGIST: CPT | Performed by: PATHOLOGY

## 2024-08-13 ENCOUNTER — OFFICE VISIT (OUTPATIENT)
Dept: SURGERY | Facility: CLINIC | Age: 44
End: 2024-08-13

## 2024-08-13 VITALS
TEMPERATURE: 97.3 F | HEIGHT: 69 IN | OXYGEN SATURATION: 99 % | WEIGHT: 166 LBS | BODY MASS INDEX: 24.59 KG/M2 | HEART RATE: 64 BPM

## 2024-08-13 DIAGNOSIS — K81.9 ACALCULOUS CHOLECYSTITIS: Primary | ICD-10-CM

## 2024-08-13 PROCEDURE — 99024 POSTOP FOLLOW-UP VISIT: CPT | Performed by: SPECIALIST

## 2024-08-13 NOTE — PROGRESS NOTES
Arlin presents today for a follow visit status post laparoscopic cholecystectomy with cholangiogram for chronic acalculous cholecystitis.    Today in the office she says she feels fine.  Her chronic back pain is totally gone which may have been related to her gallbladder.  Her bowels are moving without problem.  She feels a little bloated but other than that she has no pain.    She is shown her intraoperative cholangiogram which she enjoyed.    Physical exam: She is a adult white female awake alert no distress    Abdomen flat firm.  Laparoscopic incisions are healing well with no evidence of infection.  Her Steri-Strips removed she has excellent cosmetic results.    Impression: Doing great status post laparoscopic cholecystectomy with normal cholangiogram for chronic acalculous cholecystitis in a patient status post laparoscopic Deedee-en-Y gastric bypass.    Plan: At this point she is discharged in the office she may return her as needed.  It was great to see her.  She has done extremely well from her gastric bypass (she originally weighed 283 and now weighs 166).

## 2025-05-29 ENCOUNTER — APPOINTMENT (OUTPATIENT)
Dept: RADIOLOGY | Facility: CLINIC | Age: 45
End: 2025-05-29
Payer: COMMERCIAL

## 2025-05-29 ENCOUNTER — OFFICE VISIT (OUTPATIENT)
Dept: URGENT CARE | Facility: CLINIC | Age: 45
End: 2025-05-29
Payer: COMMERCIAL

## 2025-05-29 VITALS
BODY MASS INDEX: 26.66 KG/M2 | HEIGHT: 69 IN | WEIGHT: 180 LBS | OXYGEN SATURATION: 98 % | SYSTOLIC BLOOD PRESSURE: 124 MMHG | RESPIRATION RATE: 16 BRPM | TEMPERATURE: 98.1 F | HEART RATE: 76 BPM | DIASTOLIC BLOOD PRESSURE: 81 MMHG

## 2025-05-29 DIAGNOSIS — M79.672 LEFT FOOT PAIN: ICD-10-CM

## 2025-05-29 DIAGNOSIS — M79.672 LEFT FOOT PAIN: Primary | ICD-10-CM

## 2025-05-29 PROCEDURE — G0383 LEV 4 HOSP TYPE B ED VISIT: HCPCS

## 2025-05-29 PROCEDURE — S9083 URGENT CARE CENTER GLOBAL: HCPCS

## 2025-05-29 PROCEDURE — 73630 X-RAY EXAM OF FOOT: CPT

## 2025-05-30 ENCOUNTER — RESULTS FOLLOW-UP (OUTPATIENT)
Dept: URGENT CARE | Facility: CLINIC | Age: 45
End: 2025-05-30

## 2025-05-30 NOTE — PROGRESS NOTES
[facility]  Name: Arlin Menjivar      : 1980      MRN: [id number]  Encounter Provider: Davey Lundy PA-C  Encounter Date: 2025   Encounter department: [facility] [place]  :  Assessment & Plan  Left foot pain    Orders:    XR foot 3+ vw left; Future  Preliminary review of x-ray shows potential hairline fracture to the fifth metatarsal at its base.  Placed patient in surgical shoe with recommendation for following up with Ortho.  Will wait to see official x-ray read before following up.      Patient Instructions  Follow up with PCP in 3-5 days.  Proceed to  ER if symptoms worsen.    If tests are performed, our office will contact you with results only if changes need to made to the care plan discussed with you at the visit. You can review your full results on [facility] MyChart.    Chief Complaint:   Chief Complaint   Patient presents with    Foot Injury     Left foot pain starting 1.5 hours ago, states she stood up when foot was a sleep causing foot to twist and pop.      History of Present Illness   45-year-old female presenting with left foot pain times an hour and a half.  Patient reports she inverted and rolled her right foot after stepping on it while he was asleep.  Felt immediate pop and pain to the lateral left portion of the foot.  Reports some numbness to the area.  Pain when weightbearing.  Able to move the ankle without any difficulty and able to move the toes without any difficulty but pain when weightbearing.  Denies any prior injury to the foot.          Review of Systems   Constitutional:  Negative for chills, fatigue and fever.   HENT:  Negative for congestion, ear pain and sore throat.    Eyes:  Negative for discharge and redness.   Respiratory:  Negative for chest tightness and shortness of breath.    Cardiovascular:  Negative for chest pain and palpitations.   Gastrointestinal:  Negative for abdominal pain, nausea and vomiting.   Musculoskeletal:  Positive  "for arthralgias. Negative for myalgias.   Neurological:  Positive for numbness. Negative for dizziness, light-headedness and headaches.   Psychiatric/Behavioral:  Negative for confusion.      Past Medical History   Past Medical History[1]  Past Surgical History[2]  Family History[3]  she reports that she has never smoked. She has never used smokeless tobacco. She reports current alcohol use. She reports that she does not use drugs.  Current Outpatient Medications   Medication Instructions    acetaminophen (TYLENOL) 650 mg, Every 6 hours PRN    Cholecalciferol 50 MCG (2000 UT) CAPS Take by mouth    docusate sodium (COLACE) 100 mg, As needed    iron polysaccharides (FERREX) 150 mg, 2 times daily    multivitamin-minerals (CENTRUM) tablet 1 tablet    ondansetron (ZOFRAN ODT) 4 mg, Oral, Every 6 hours PRN    Zinc Sulfate (ZINC 15 PO) Take by mouth   Allergies[4]     Objective   /81   Pulse 76   Temp 98.1 °F (36.7 °C)   Resp 16   Ht 5' 9\" (1.753 m)   Wt 81.6 kg (180 lb)   LMP 07/29/2019 (Exact Date)   SpO2 98%   BMI 26.58 kg/m²      Physical Exam  Vitals and nursing note reviewed.   Constitutional:       Appearance: She is normal weight.   HENT:      Head: Normocephalic and atraumatic.      Right Ear: External ear normal.      Left Ear: External ear normal.      Nose: Nose normal.      Mouth/Throat:      Mouth: Mucous membranes are moist.      Pharynx: Oropharynx is clear.     Eyes:      Conjunctiva/sclera: Conjunctivae normal.      Pupils: Pupils are equal, round, and reactive to light.       Cardiovascular:      Rate and Rhythm: Normal rate and regular rhythm.      Pulses: Normal pulses.   Pulmonary:      Effort: Pulmonary effort is normal.     Musculoskeletal:      Comments: Tenderness to palpation along the lateral left portion of the fifth metatarsal particular at the base.  No visible redness or swelling.  Neurovascular intact with equal bilateral sensation on physical exam.  2+ DP pulses.  No visible " "gross deformities.  Normal ankle and foot range of motion.     Skin:     General: Skin is warm and dry.      Capillary Refill: Capillary refill takes less than 2 seconds.     Neurological:      General: No focal deficit present.      Mental Status: She is alert and oriented to person, place, and time.      Sensory: No sensory deficit.      Motor: No weakness.      Gait: Gait normal.     Psychiatric:         Mood and Affect: Mood normal.         Behavior: Behavior normal.         Portions of the record may have been created with voice recognition software.  Occasional wrong word or \"sound a like\" substitutions may have occurred due to the inherent limitations of voice recognition software.  Read the chart carefully and recognize, using context, where substitutions have occurred.       [1]   Past Medical History:  Diagnosis Date    Arthritis     arthritis    Factor 5 Leiden mutation, heterozygous (HCC)     when pregnant    Migraines     PONV (postoperative nausea and vomiting)     Wears contact lenses     Wears glasses    [2]   Past Surgical History:  Procedure Laterality Date     SECTION      DILATION AND CURETTAGE OF UTERUS      GASTRIC BYPASS      HYSTERECTOMY      WA LAPS SURG CHOLECYSTECTOMY W/CHOLANGIOGRAPHY N/A 2024    Procedure: LAPAROSCOPIC CHOLECYSTECTOMY  WITH CHOLANGIOGRAM;  Surgeon: Santhosh Solorio MD;  Location:  MAIN OR;  Service: General    WISDOM TOOTH EXTRACTION     [3]   Family History  Problem Relation Name Age of Onset    Hypertension Mother      Cataracts Mother      Cancer Mother      Diabetes Father      Cancer Maternal Grandmother     [4]   Allergies  Allergen Reactions    Aspartame - Food Allergy Headache    Other Diarrhea     Sugar \"Dumping syndrome S/P Gastric Bypass\"      "

## (undated) DEVICE — DISPOSABLE OR TOWEL: Brand: CARDINAL HEALTH

## (undated) DEVICE — INTENDED FOR TISSUE SEPARATION, AND OTHER PROCEDURES THAT REQUIRE A SHARP SURGICAL BLADE TO PUNCTURE OR CUT.: Brand: BARD-PARKER SAFETY BLADES SIZE 11, STERILE

## (undated) DEVICE — TROCAR: Brand: KII® SLEEVE

## (undated) DEVICE — SUT MONOCRYL 4-0 PS-2 27 IN Y426H

## (undated) DEVICE — SCD SEQUENTIAL COMPRESSION COMFORT SLEEVE MEDIUM KNEE LENGTH: Brand: KENDALL SCD

## (undated) DEVICE — INSUFFLATION NEEDLE TO ESTABLISH PNEUMOPERITONEUM.: Brand: INSUFFLATION NEEDLE

## (undated) DEVICE — SWABSTCK, BENZOIN TINCTURE, 1/PK, STRL: Brand: APLICARE

## (undated) DEVICE — TROCAR: Brand: KII FIOS FIRST ENTRY

## (undated) DEVICE — SUT VICRYL 0 UR-6 27 IN J603H

## (undated) DEVICE — NEEDLE BLUNT 18 G X 1 1/2IN

## (undated) DEVICE — LIGAMAX 5 MM ENDOSCOPIC MULTIPLE CLIP APPLIER: Brand: LIGAMAX

## (undated) DEVICE — CHLORAPREP HI-LITE 26ML ORANGE

## (undated) DEVICE — ALLENTOWN LAP CHOLE APP PACK: Brand: CARDINAL HEALTH

## (undated) DEVICE — STERILE POLYISOPRENE POWDER-FREE SURGICAL GLOVES: Brand: PROTEXIS

## (undated) DEVICE — TUBING INSUFFLATION SET ISO CONNECTOR HEATED

## (undated) DEVICE — SINGLE PORT MANIFOLD: Brand: NEPTUNE 2

## (undated) DEVICE — SYRINGE 30ML LL

## (undated) DEVICE — 3M™ STERI-STRIP™ REINFORCED ADHESIVE SKIN CLOSURES, R1547, 1/2 IN X 4 IN (12 MM X 100 MM), 6 STRIPS/ENVELOPE: Brand: 3M™ STERI-STRIP™

## (undated) DEVICE — SWITCH BLADE TIP CURVED METZ